# Patient Record
Sex: MALE | Race: WHITE | NOT HISPANIC OR LATINO | Employment: OTHER | URBAN - METROPOLITAN AREA
[De-identification: names, ages, dates, MRNs, and addresses within clinical notes are randomized per-mention and may not be internally consistent; named-entity substitution may affect disease eponyms.]

---

## 2020-12-01 ENCOUNTER — TELEPHONE (OUTPATIENT)
Dept: PEDIATRICS | Facility: CLINIC | Age: 75
End: 2020-12-01

## 2020-12-01 ENCOUNTER — OFFICE VISIT (OUTPATIENT)
Dept: PEDIATRICS | Facility: CLINIC | Age: 75
End: 2020-12-01
Payer: COMMERCIAL

## 2020-12-01 VITALS
HEIGHT: 73 IN | OXYGEN SATURATION: 98 % | WEIGHT: 210 LBS | BODY MASS INDEX: 27.83 KG/M2 | DIASTOLIC BLOOD PRESSURE: 75 MMHG | SYSTOLIC BLOOD PRESSURE: 165 MMHG | HEART RATE: 65 BPM | TEMPERATURE: 97.8 F

## 2020-12-01 DIAGNOSIS — G31.84 MILD COGNITIVE IMPAIRMENT: ICD-10-CM

## 2020-12-01 DIAGNOSIS — E78.00 HYPERCHOLESTEREMIA: ICD-10-CM

## 2020-12-01 DIAGNOSIS — E66.3 OVERWEIGHT (BMI 25.0-29.9): ICD-10-CM

## 2020-12-01 DIAGNOSIS — N40.1 BPH WITH OBSTRUCTION/LOWER URINARY TRACT SYMPTOMS: ICD-10-CM

## 2020-12-01 DIAGNOSIS — N13.8 BPH WITH OBSTRUCTION/LOWER URINARY TRACT SYMPTOMS: ICD-10-CM

## 2020-12-01 DIAGNOSIS — F41.1 GAD (GENERALIZED ANXIETY DISORDER): Primary | ICD-10-CM

## 2020-12-01 DIAGNOSIS — F41.1 GAD (GENERALIZED ANXIETY DISORDER): ICD-10-CM

## 2020-12-01 DIAGNOSIS — Z12.11 SCREENING FOR COLON CANCER: ICD-10-CM

## 2020-12-01 DIAGNOSIS — R73.01 IFG (IMPAIRED FASTING GLUCOSE): ICD-10-CM

## 2020-12-01 PROCEDURE — 99204 OFFICE O/P NEW MOD 45 MIN: CPT | Performed by: INTERNAL MEDICINE

## 2020-12-01 RX ORDER — SERTRALINE HYDROCHLORIDE 25 MG/1
50 TABLET, FILM COATED ORAL DAILY
Qty: 90 TABLET | Refills: 3 | Status: SHIPPED | OUTPATIENT
Start: 2020-12-01 | End: 2020-12-01

## 2020-12-01 RX ORDER — ATORVASTATIN CALCIUM 10 MG/1
10 TABLET, FILM COATED ORAL DAILY
Qty: 90 TABLET | Refills: 3 | Status: SHIPPED | OUTPATIENT
Start: 2020-12-01 | End: 2022-01-24

## 2020-12-01 RX ORDER — TAMSULOSIN HYDROCHLORIDE 0.4 MG/1
0.4 CAPSULE ORAL DAILY
Qty: 90 CAPSULE | Refills: 3 | Status: SHIPPED | OUTPATIENT
Start: 2020-12-01 | End: 2022-01-24

## 2020-12-01 RX ORDER — SERTRALINE HYDROCHLORIDE 25 MG/1
50 TABLET, FILM COATED ORAL DAILY
Qty: 180 TABLET | Refills: 3
Start: 2020-12-01 | End: 2020-12-01

## 2020-12-01 SDOH — HEALTH STABILITY: MENTAL HEALTH: HOW MANY STANDARD DRINKS CONTAINING ALCOHOL DO YOU HAVE ON A TYPICAL DAY?: 1 OR 2

## 2020-12-01 SDOH — HEALTH STABILITY: MENTAL HEALTH: HOW OFTEN DO YOU HAVE A DRINK CONTAINING ALCOHOL?: 2-3 TIMES A WEEK

## 2020-12-01 SDOH — HEALTH STABILITY: MENTAL HEALTH: HOW MANY DRINKS CONTAINING ALCOHOL DO YOU HAVE ON A TYPICAL DAY WHEN YOU ARE DRINKING?: 1 OR 2

## 2020-12-01 SDOH — HEALTH STABILITY: MENTAL HEALTH: HOW OFTEN DO YOU HAVE 6 OR MORE DRINKS ON ONE OCCASION?: LESS THAN MONTHLY

## 2020-12-01 SDOH — HEALTH STABILITY: MENTAL HEALTH: HOW OFTEN DO YOU HAVE SIX OR MORE DRINKS ON ONE OCCASION?: LESS THAN MONTHLY

## 2020-12-01 ASSESSMENT — MIFFLIN-ST. JEOR: SCORE: 1751.43

## 2020-12-01 NOTE — PROGRESS NOTES
Subjective     Kendrick Parr is a 75 year old male who presents to clinic today for the following health issues:    HPI         New Patient/Transfer of Care    Pt is here to Establish care    74-year-old gentleman has come in to establish care with me.  He has a history of hypercholesterolemia, impaired fasting glucose and generalized anxiety disorder.  Patient takes atorvastatin for hypercholesterolemia and 25 mg of sertraline for anxiety disorder.  He indicates that his anxiety has been worse lately and notices more particularly when he is with other people.  He was wondering if the dose of sertraline could be increased.  He also indicates that he has to get up once or twice at night to urinate and the stream at times is slow.  No hematuria or burning in the urine.  He denies chest pain or shortness of breath.  He is a non-smoker.  He exercises 5 days a week.  He still employed and quite active.      I reviewed his medical record in care everywhere.  It looks like that in 2012 and 2013 he was seen by a neurologist for possible memory issues.  He had MRI scan of the brain, lab and neuropsych testing.  At that time he was thought to have possible mild cognitive impairment.  Patient has not noticed any change in his memory since then.  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  He has no issues using the computer and connecting [place].    Review of Systems   Constitutional, HEENT, cardiovascular, pulmonary, GI, , musculoskeletal, neuro, skin, endocrine and psych systems are negative, except as otherwise noted.      Objective    There were no vitals taken for this visit.  There is no height or weight on file to calculate BMI.  Physical Exam   GENERAL: healthy, alert and no distress  EYES: Eyes grossly normal to inspection, PERRL and conjunctivae and sclerae normal  HENT: ear canals and TM's normal, nose and mouth without ulcers or  "lesions  NECK: no adenopathy, no asymmetry, masses, or scars and thyroid normal to palpation  RESP: lungs clear to auscultation - no rales, rhonchi or wheezes  CV: regular rate and rhythm, normal S1 S2, no S3 or S4, no murmur, click or rub, no peripheral edema and peripheral pulses strong  ABDOMEN: soft, nontender, no hepatosplenomegaly, no masses and bowel sounds normal   (male): normal male genitalia without lesions or urethral discharge, no hernia  RECTAL: normal sphincter tone, no rectal masses, prostate normal size, smooth, nontender without nodules or masses  MS: no gross musculoskeletal defects noted, no edema  SKIN: no suspicious lesions or rashes  NEURO: Normal strength and tone, mentation intact and speech normal  PSYCH: mentation appears normal, affect normal/bright  LYMPH: no cervical, supraclavicular, axillary, or inguinal adenopathy            Assessment & Plan     1.  Generalized anxiety disorder.  Will increase sertraline to 50 mg daily.  2.  Benign prostate hypertrophy with lower urinary obstructive symptoms.  His flow has slowed down so I will add tamsulosin 0.4 mg daily and see if it helps.  3.  Hypercholesterolemia on atorvastatin 10 mg a day.  He will come in fasting to have lipids checked.  4.  Impaired fasting glucose.  I will have him come fasting to have BMP and A1c checked.  5.  Overweight.  6.  Screening for colorectal cancer.  Patient referred for colonoscopy.  7.  Mild cognitive impairment diagnosed in 2012 with no clear evidence of progression.  It is possible that the patient just has mild memory loss of aging.  No overt evidence of dementia.    He already has had a flu vaccine.  I will get back to the results of the aforementioned lab with recommendation for follow-up.     BMI:   Estimated body mass index is 27.71 kg/m  as calculated from the following:    Height as of this encounter: 1.854 m (6' 1\").    Weight as of this encounter: 95.3 kg (210 lb).   Weight management plan: " Discussed healthy diet and exercise guidelines             No follow-ups on file.    Rakesh Santoro MD  [facility]

## 2020-12-01 NOTE — TELEPHONE ENCOUNTER
Spoke with pharmacy staff, requesting clarification of Sertraline prescribed today.  Dose was increased to sertraline 50 mg daily, but qty was written for 45 day supply.  Advised may increase to 90 day supply with refills, chart updated.      Emilia Hathaway RN

## 2020-12-01 NOTE — TELEPHONE ENCOUNTER
New prescription to [facility] sent of sertraline 50 mg once a day 90 tablets x 3 refill.

## 2020-12-03 DIAGNOSIS — R73.01 IFG (IMPAIRED FASTING GLUCOSE): ICD-10-CM

## 2020-12-03 DIAGNOSIS — N13.8 BPH WITH OBSTRUCTION/LOWER URINARY TRACT SYMPTOMS: ICD-10-CM

## 2020-12-03 DIAGNOSIS — N40.1 BPH WITH OBSTRUCTION/LOWER URINARY TRACT SYMPTOMS: ICD-10-CM

## 2020-12-03 DIAGNOSIS — E78.00 HYPERCHOLESTEREMIA: ICD-10-CM

## 2020-12-03 LAB
ANION GAP SERPL CALCULATED.3IONS-SCNC: 4 MMOL/L (ref 3–14)
BUN SERPL-MCNC: 23 MG/DL (ref 7–30)
CALCIUM SERPL-MCNC: 8.8 MG/DL (ref 8.5–10.1)
CHLORIDE SERPL-SCNC: 107 MMOL/L (ref 94–109)
CHOLEST SERPL-MCNC: 123 MG/DL
CO2 SERPL-SCNC: 28 MMOL/L (ref 20–32)
CREAT SERPL-MCNC: 0.75 MG/DL (ref 0.66–1.25)
GFR SERPL CREATININE-BSD FRML MDRD: >90 ML/MIN/{1.73_M2}
GLUCOSE SERPL-MCNC: 138 MG/DL (ref 70–99)
HBA1C MFR BLD: 5.7 % (ref 0–5.6)
HDLC SERPL-MCNC: 45 MG/DL
LDLC SERPL CALC-MCNC: 67 MG/DL
NONHDLC SERPL-MCNC: 78 MG/DL
POTASSIUM SERPL-SCNC: 3.8 MMOL/L (ref 3.4–5.3)
SODIUM SERPL-SCNC: 139 MMOL/L (ref 133–144)
TRIGL SERPL-MCNC: 55 MG/DL

## 2020-12-03 PROCEDURE — 36415 COLL VENOUS BLD VENIPUNCTURE: CPT | Performed by: INTERNAL MEDICINE

## 2020-12-03 PROCEDURE — 80048 BASIC METABOLIC PNL TOTAL CA: CPT | Performed by: INTERNAL MEDICINE

## 2020-12-03 PROCEDURE — 83036 HEMOGLOBIN GLYCOSYLATED A1C: CPT | Performed by: INTERNAL MEDICINE

## 2020-12-03 PROCEDURE — 80061 LIPID PANEL: CPT | Performed by: INTERNAL MEDICINE

## 2020-12-06 DIAGNOSIS — Z11.59 ENCOUNTER FOR SCREENING FOR OTHER VIRAL DISEASES: Primary | ICD-10-CM

## 2020-12-20 ENCOUNTER — HEALTH MAINTENANCE LETTER (OUTPATIENT)
Age: 75
End: 2020-12-20

## 2020-12-28 RX ORDER — SODIUM, POTASSIUM,MAG SULFATES 17.5-3.13G
1 SOLUTION, RECONSTITUTED, ORAL ORAL SEE ADMIN INSTRUCTIONS
Qty: 2 BOTTLE | Refills: 0 | Status: SHIPPED | OUTPATIENT
Start: 2020-12-28 | End: 2022-02-22

## 2020-12-28 RX ORDER — BISACODYL 5 MG/1
15 TABLET, DELAYED RELEASE ORAL SEE ADMIN INSTRUCTIONS
Qty: 3 TABLET | Refills: 0 | Status: SHIPPED | OUTPATIENT
Start: 2020-12-28 | End: 2022-02-22

## 2021-01-04 ENCOUNTER — HOSPITAL ENCOUNTER (OUTPATIENT)
Facility: AMBULATORY SURGERY CENTER | Age: 76
Discharge: HOME | End: 2021-01-04
Attending: INTERNAL MEDICINE | Admitting: INTERNAL MEDICINE
Payer: COMMERCIAL

## 2021-01-04 VITALS
OXYGEN SATURATION: 96 % | SYSTOLIC BLOOD PRESSURE: 148 MMHG | HEART RATE: 65 BPM | DIASTOLIC BLOOD PRESSURE: 85 MMHG | TEMPERATURE: 97.8 F | RESPIRATION RATE: 18 BRPM

## 2021-01-04 DIAGNOSIS — Z12.11 COLON CANCER SCREENING: Primary | ICD-10-CM

## 2021-01-04 LAB — COLONOSCOPY: NORMAL

## 2021-01-04 PROCEDURE — G8907 PT DOC NO EVENTS ON DISCHARG: HCPCS

## 2021-01-04 PROCEDURE — 88305 TISSUE EXAM BY PATHOLOGIST: CPT | Mod: GC | Performed by: PATHOLOGY

## 2021-01-04 PROCEDURE — G8918 PT W/O PREOP ORDER IV AB PRO: HCPCS

## 2021-01-04 PROCEDURE — 45385 COLONOSCOPY W/LESION REMOVAL: CPT

## 2021-01-04 RX ORDER — NALOXONE HYDROCHLORIDE 0.4 MG/ML
0.4 INJECTION, SOLUTION INTRAMUSCULAR; INTRAVENOUS; SUBCUTANEOUS
Status: DISCONTINUED | OUTPATIENT
Start: 2021-01-04 | End: 2021-01-05 | Stop reason: HOSPADM

## 2021-01-04 RX ORDER — FLUMAZENIL 0.1 MG/ML
0.2 INJECTION, SOLUTION INTRAVENOUS
Status: SHIPPED | OUTPATIENT
Start: 2021-01-04 | End: 2021-01-04

## 2021-01-04 RX ORDER — LIDOCAINE 40 MG/G
CREAM TOPICAL
Status: DISCONTINUED | OUTPATIENT
Start: 2021-01-04 | End: 2021-01-05 | Stop reason: HOSPADM

## 2021-01-04 RX ORDER — ONDANSETRON 2 MG/ML
4 INJECTION INTRAMUSCULAR; INTRAVENOUS
Status: DISCONTINUED | OUTPATIENT
Start: 2021-01-04 | End: 2021-01-05 | Stop reason: HOSPADM

## 2021-01-04 RX ORDER — ONDANSETRON 4 MG/1
4 TABLET, ORALLY DISINTEGRATING ORAL EVERY 6 HOURS PRN
Status: DISCONTINUED | OUTPATIENT
Start: 2021-01-04 | End: 2021-01-05 | Stop reason: HOSPADM

## 2021-01-04 RX ORDER — NALOXONE HYDROCHLORIDE 0.4 MG/ML
0.2 INJECTION, SOLUTION INTRAMUSCULAR; INTRAVENOUS; SUBCUTANEOUS
Status: DISCONTINUED | OUTPATIENT
Start: 2021-01-04 | End: 2021-01-05 | Stop reason: HOSPADM

## 2021-01-04 RX ORDER — PROCHLORPERAZINE MALEATE 5 MG
5 TABLET ORAL EVERY 6 HOURS PRN
Status: DISCONTINUED | OUTPATIENT
Start: 2021-01-04 | End: 2021-01-05 | Stop reason: HOSPADM

## 2021-01-04 RX ORDER — FENTANYL CITRATE 50 UG/ML
INJECTION, SOLUTION INTRAMUSCULAR; INTRAVENOUS PRN
Status: DISCONTINUED | OUTPATIENT
Start: 2021-01-04 | End: 2021-01-04 | Stop reason: HOSPADM

## 2021-01-04 RX ORDER — ONDANSETRON 2 MG/ML
4 INJECTION INTRAMUSCULAR; INTRAVENOUS EVERY 6 HOURS PRN
Status: DISCONTINUED | OUTPATIENT
Start: 2021-01-04 | End: 2021-01-05 | Stop reason: HOSPADM

## 2021-01-08 LAB — COPATH REPORT: NORMAL

## 2021-10-03 ENCOUNTER — HEALTH MAINTENANCE LETTER (OUTPATIENT)
Age: 76
End: 2021-10-03

## 2022-01-22 DIAGNOSIS — N40.1 BPH WITH OBSTRUCTION/LOWER URINARY TRACT SYMPTOMS: ICD-10-CM

## 2022-01-22 DIAGNOSIS — E78.00 HYPERCHOLESTEREMIA: ICD-10-CM

## 2022-01-22 DIAGNOSIS — N13.8 BPH WITH OBSTRUCTION/LOWER URINARY TRACT SYMPTOMS: ICD-10-CM

## 2022-01-22 DIAGNOSIS — F41.1 GAD (GENERALIZED ANXIETY DISORDER): ICD-10-CM

## 2022-01-23 ENCOUNTER — HEALTH MAINTENANCE LETTER (OUTPATIENT)
Age: 77
End: 2022-01-23

## 2022-01-24 RX ORDER — TAMSULOSIN HYDROCHLORIDE 0.4 MG/1
CAPSULE ORAL
Qty: 90 CAPSULE | Refills: 0 | Status: SHIPPED | OUTPATIENT
Start: 2022-01-24 | End: 2022-05-13

## 2022-01-24 RX ORDER — ATORVASTATIN CALCIUM 10 MG/1
TABLET, FILM COATED ORAL
Qty: 90 TABLET | Refills: 0 | Status: SHIPPED | OUTPATIENT
Start: 2022-01-24 | End: 2022-05-13

## 2022-01-24 NOTE — TELEPHONE ENCOUNTER
"Requested Prescriptions   Pending Prescriptions Disp Refills    sertraline (ZOLOFT) 50 MG tablet [Pharmacy Med Name: Sertraline HCl Oral Tablet 50 MG] 90 tablet 0     Sig: TAKE 1 TABLET BY MOUTH ONE TIME DAILY        SSRIs Protocol Failed - 1/22/2022 10:25 AM        Failed - Recent (12 mo) or future (30 days) visit within the authorizing provider's specialty     Patient has had an office visit with the authorizing provider or a provider within the authorizing providers department within the previous 12 mos or has a future within next 30 days. See \"Patient Info\" tab in inbasket, or \"Choose Columns\" in Meds & Orders section of the refill encounter.              Passed - Medication is active on med list        Passed - Patient is age 18 or older           atorvastatin (LIPITOR) 10 MG tablet [Pharmacy Med Name: Atorvastatin Calcium Oral Tablet 10 MG] 90 tablet 0     Sig: TAKE 1 TABLET BY MOUTH ONE TIME DAILY        Statins Protocol Failed - 1/22/2022 10:25 AM        Failed - LDL on file in past 12 months     Recent Labs   Lab Test 12/03/20  0738   LDL 67               Failed - Recent (12 mo) or future (30 days) visit within the authorizing provider's specialty     Patient has had an office visit with the authorizing provider or a provider within the authorizing providers department within the previous 12 mos or has a future within next 30 days. See \"Patient Info\" tab in inbasket, or \"Choose Columns\" in Meds & Orders section of the refill encounter.              Passed - No abnormal creatine kinase in past 12 months     No lab results found.             Passed - Medication is active on med list        Passed - Patient is age 18 or older           tamsulosin (FLOMAX) 0.4 MG capsule [Pharmacy Med Name: Tamsulosin HCl Oral Capsule 0.4 MG] 90 capsule 0     Sig: TAKE 1 CAPSULE BY MOUTH ONE TIME DAILY        Alpha Blockers Failed - 1/22/2022 10:25 AM        Failed - Blood pressure under 140/90 in past 12 months       BP " "Readings from Last 3 Encounters:   01/04/21 (!) 148/85   12/01/20 (!) 165/75                 Failed - Recent (12 mo) or future (30 days) visit within the authorizing provider's specialty     Patient has had an office visit with the authorizing provider or a provider within the authorizing providers department within the previous 12 mos or has a future within next 30 days. See \"Patient Info\" tab in inbasket, or \"Choose Columns\" in Meds & Orders section of the refill encounter.              Passed - Patient does not have Tadalafil, Vardenafil, or Sildenafil on their medication list        Passed - Medication is active on med list        Passed - Patient is 18 years of age or older            Appointments in Next Year      Feb 22, 2022  7:40 AM  (Arrive by 7:20 AM)  Provider Visit with Rakesh Santoro MD  [facility] ([facility] ) [phone]            "

## 2022-01-25 ENCOUNTER — TELEPHONE (OUTPATIENT)
Dept: FAMILY MEDICINE | Facility: CLINIC | Age: 77
End: 2022-01-25
Payer: COMMERCIAL

## 2022-01-25 DIAGNOSIS — E78.00 HYPERCHOLESTEREMIA: ICD-10-CM

## 2022-01-25 DIAGNOSIS — R73.01 IFG (IMPAIRED FASTING GLUCOSE): Primary | ICD-10-CM

## 2022-01-25 NOTE — TELEPHONE ENCOUNTER
Patient called: let him know refill has been sent in    He has a physical scheduled in February. And a lab only appointment the week before.    Sent Dr Santoro a message requesting lab orders be placed.

## 2022-02-14 ENCOUNTER — LAB (OUTPATIENT)
Dept: LAB | Facility: CLINIC | Age: 77
End: 2022-02-14
Payer: COMMERCIAL

## 2022-02-14 DIAGNOSIS — R73.01 IFG (IMPAIRED FASTING GLUCOSE): ICD-10-CM

## 2022-02-14 DIAGNOSIS — E78.00 HYPERCHOLESTEREMIA: ICD-10-CM

## 2022-02-14 LAB
ANION GAP SERPL CALCULATED.3IONS-SCNC: 3 MMOL/L (ref 3–14)
BUN SERPL-MCNC: 21 MG/DL (ref 7–30)
CALCIUM SERPL-MCNC: 9.4 MG/DL (ref 8.5–10.1)
CHLORIDE BLD-SCNC: 107 MMOL/L (ref 94–109)
CHOLEST SERPL-MCNC: 141 MG/DL
CO2 SERPL-SCNC: 30 MMOL/L (ref 20–32)
CREAT SERPL-MCNC: 0.78 MG/DL (ref 0.66–1.25)
FASTING STATUS PATIENT QL REPORTED: YES
GFR SERPL CREATININE-BSD FRML MDRD: >90 ML/MIN/1.73M2
GLUCOSE BLD-MCNC: 141 MG/DL (ref 70–99)
HBA1C MFR BLD: 6.1 % (ref 0–5.6)
HDLC SERPL-MCNC: 41 MG/DL
LDLC SERPL CALC-MCNC: 91 MG/DL
NONHDLC SERPL-MCNC: 100 MG/DL
POTASSIUM BLD-SCNC: 4.4 MMOL/L (ref 3.4–5.3)
SODIUM SERPL-SCNC: 140 MMOL/L (ref 133–144)
TRIGL SERPL-MCNC: 47 MG/DL

## 2022-02-14 PROCEDURE — 80048 BASIC METABOLIC PNL TOTAL CA: CPT

## 2022-02-14 PROCEDURE — 83036 HEMOGLOBIN GLYCOSYLATED A1C: CPT

## 2022-02-14 PROCEDURE — 80061 LIPID PANEL: CPT

## 2022-02-14 PROCEDURE — 36415 COLL VENOUS BLD VENIPUNCTURE: CPT

## 2022-02-15 NOTE — RESULT ENCOUNTER NOTE
Dr. Yvan Escalante is out of the office this week .I reviewed your lab results in his absence   Your lab test showed excellent fasting cholesterol , normal electrolytes and elevated fasting blood sugar and A1c consistent with prediabetes .  Dr. Santoro will discuss about these at your visit next week.   Let me know if you have any questions. Take care.  Jaswinder Sweeney MD

## 2022-02-22 ENCOUNTER — OFFICE VISIT (OUTPATIENT)
Dept: FAMILY MEDICINE | Facility: CLINIC | Age: 77
End: 2022-02-22
Payer: COMMERCIAL

## 2022-02-22 VITALS
OXYGEN SATURATION: 98 % | SYSTOLIC BLOOD PRESSURE: 138 MMHG | HEART RATE: 72 BPM | TEMPERATURE: 98.2 F | HEIGHT: 73 IN | WEIGHT: 213.7 LBS | RESPIRATION RATE: 16 BRPM | DIASTOLIC BLOOD PRESSURE: 70 MMHG | BODY MASS INDEX: 28.32 KG/M2

## 2022-02-22 DIAGNOSIS — N40.1 BPH WITH OBSTRUCTION/LOWER URINARY TRACT SYMPTOMS: ICD-10-CM

## 2022-02-22 DIAGNOSIS — Z23 NEED FOR PROPHYLACTIC VACCINATION AND INOCULATION AGAINST INFLUENZA: ICD-10-CM

## 2022-02-22 DIAGNOSIS — H90.3 BILATERAL SENSORINEURAL HEARING LOSS: ICD-10-CM

## 2022-02-22 DIAGNOSIS — F41.1 GAD (GENERALIZED ANXIETY DISORDER): ICD-10-CM

## 2022-02-22 DIAGNOSIS — N13.8 BPH WITH OBSTRUCTION/LOWER URINARY TRACT SYMPTOMS: ICD-10-CM

## 2022-02-22 DIAGNOSIS — E78.00 HYPERCHOLESTEREMIA: ICD-10-CM

## 2022-02-22 DIAGNOSIS — R73.01 IFG (IMPAIRED FASTING GLUCOSE): ICD-10-CM

## 2022-02-22 DIAGNOSIS — Z00.00 MEDICARE ANNUAL WELLNESS VISIT, INITIAL: Primary | ICD-10-CM

## 2022-02-22 DIAGNOSIS — R20.0 NUMBNESS OF TOES: ICD-10-CM

## 2022-02-22 DIAGNOSIS — E66.3 OVERWEIGHT (BMI 25.0-29.9): ICD-10-CM

## 2022-02-22 PROCEDURE — 90662 IIV NO PRSV INCREASED AG IM: CPT | Performed by: INTERNAL MEDICINE

## 2022-02-22 PROCEDURE — 99214 OFFICE O/P EST MOD 30 MIN: CPT | Mod: 25 | Performed by: INTERNAL MEDICINE

## 2022-02-22 PROCEDURE — G0008 ADMIN INFLUENZA VIRUS VAC: HCPCS | Performed by: INTERNAL MEDICINE

## 2022-02-22 PROCEDURE — 99397 PER PM REEVAL EST PAT 65+ YR: CPT | Mod: 25 | Performed by: INTERNAL MEDICINE

## 2022-02-22 ASSESSMENT — PAIN SCALES - GENERAL: PAINLEVEL: NO PAIN (0)

## 2022-02-22 NOTE — PROGRESS NOTES
SUBJECTIVE:   Kendrick Parr is a 76 year old male who presents for Preventive Visit.    76-year-old comes in for annual Medicare physical examination as well as follow-up on hyperlipidemia, impaired fasting glucose, BPH and anxiety disorder.  Overall he is doing well..  He is still working and enjoys what he does.  He exercise by walking and he does some weight resistant exercise 3 times a week at the gym.  He does not use tobacco or abuse alcohol.  He has gained few pounds since last clinic visit.  Does have nocturia at least once a night.  Complains of some hearing issues.    Patient has been advised of split billing requirements and indicates understanding: Yes  Are you in the first 12 months of your Medicare coverage?  No    History of Present Illness     Reason for visit:  Annual exam an pain in toes  Symptom onset:  More than a month  Symptoms include:  Numbness  Symptom intensity:  Mild  Symptom progression:  Staying the same  Had these symptoms before:  Yes  Has tried/received treatment for these symptoms:  No  What makes it better:  Exercise    He eats 2-3 servings of fruits and vegetables daily.He consumes 0 sweetened beverage(s) daily.He exercises with enough effort to increase his heart rate 20 to 29 minutes per day.  He exercises with enough effort to increase his heart rate 3 or less days per week.   He is taking medications regularly.    Do you feel safe in your environment? Yes    Have you ever done Advance Care Planning? (For example, a Health Directive, POLST, or a discussion with a medical provider or your loved ones about your wishes): No, advance care planning information given to patient to review.  Patient plans to discuss their wishes with loved ones or provider.          Hearing Assessment: decrease hearing. Will refer to audiology   Fall risk  Fallen 2 or more times in the past year?: No  Any fall with injury in the past year?: No  click delete button to remove this line now  Cognitive  Screening   1) Repeat 3 items (Leader, Season, Table)  Normal   2) Clock draw: NORMAL  3) 3 item recall: Recalls 3 objects  Results: NORMAL clock, 1-2 items recalled: COGNITIVE IMPAIRMENT LESS LIKELY    Mini-CogTM Copyright S [employer]. Licensed by the author for use in [facility]; reprinted with permission (felipe@[facility]). All rights reserved.      Do you have sleep apnea, excessive snoring or daytime drowsiness?: no    Reviewed and updated as needed this visit by clinical staff                  Reviewed and updated as needed this visit by Provider                 Social History     Tobacco Use     Smoking status: Never Smoker     Smokeless tobacco: Never Used   Substance Use Topics     Alcohol use: Yes     Comment: occas         No flowsheet data found.        Pain in feet and legs cramps on both legs.    Current providers sharing in care for this patient include:   Patient Care Team:  No Ref-Primary, Physician as PCP - Rakesh Taveras MD as Assigned PCP    The following health maintenance items are reviewed in Epic and correct as of today:  Health Maintenance Due   Topic Date Due     ANNUAL REVIEW OF  ORDERS  Never done     ADVANCE CARE PLANNING  Never done     HEPATITIS C SCREENING  Never done     MEDICARE ANNUAL WELLNESS VISIT  Never done     FALL RISK ASSESSMENT  Never done     AORTIC ANEURYSM SCREENING (SYSTEM ASSIGNED)  Never done     ZOSTER IMMUNIZATION (2 of 3) 12/30/2015     INFLUENZA VACCINE (1) 09/01/2021     COVID-19 Vaccine (3 - Booster for Pfizer series) 10/06/2021     PHQ-2  Never done     BP Readings from Last 3 Encounters:   02/22/22 (!) 162/82   01/04/21 (!) 148/85   12/01/20 (!) 165/75    Wt Readings from Last 3 Encounters:   02/22/22 96.9 kg (213 lb 11.2 oz)   12/01/20 95.3 kg (210 lb)                  Patient Active Problem List   Diagnosis     ROBE (generalized anxiety disorder)     Hypercholesteremia     IFG (impaired fasting glucose)     Overweight (BMI 25.0-29.9)      Mild cognitive impairment     BPH with obstruction/lower urinary tract symptoms     Past Surgical History:   Procedure Laterality Date     APPENDECTOMY  1960     COLONOSCOPY WITH CO2 INSUFFLATION N/A 1/4/2021    Procedure: COLONOSCOPY, WITH CO2 INSUFFLATION;  Surgeon: Livier Nguyen DO;  Location: [facility] OR     OPEN REDUCTION INTERNAL FIXATION FOREARM  2017       Social History     Tobacco Use     Smoking status: Never Smoker     Smokeless tobacco: Never Used   Substance Use Topics     Alcohol use: Yes     Comment: occas     Family History   Problem Relation Age of Onset     Alzheimer Disease Mother 86     Leukemia Father 86     Lymphoma Sister 51         Current Outpatient Medications   Medication Sig Dispense Refill     atorvastatin (LIPITOR) 10 MG tablet TAKE 1 TABLET BY MOUTH ONE TIME DAILY 90 tablet 0     bisacodyl (DULCOLAX) 5 MG EC tablet Take 3 tablets (15 mg) by mouth See Admin Instructions --Take at 5 PM day prior to procedure 3 tablet 0     cyanocobalamin (VITAMIN  B-12) 1000 MCG tablet Take 2,000 mcg by mouth daily       Na Sulfate-K Sulfate-Mg Sulf (SUPREP BOWEL PREP KIT) solution Take 177 mLs (1 Bottle) by mouth See Admin Instructions -Split dose 2 day Regimen: The evening before your procedure: dilute one bottle with water to a total volume of 16oz (up to the fill line).  At 6pm,  drink the entire amount.  Drink 32 oz of water over the next hour. The morning of your procedure repeat both steps above using the second bottle.  Start 5 hours before your procedure and complete the prep at least 3 hours before you arrive. 2 Bottle 0     polyethylene glycol (GOLYTELY) 236 g suspension Take 4,000 mLs by mouth See Admin Instructions --Drink half gallon (64oz) at 6pm on evening prior to procedure and second half on the morning of procedure (4 hours prior to procedure time) 1 each 0     sertraline (ZOLOFT) 50 MG tablet TAKE 1 TABLET BY MOUTH ONE TIME DAILY 90 tablet 0     Simethicone 125 MG TABS Take 125 mg by  "mouth See Admin Instructions --Take tablet after finishing second half of Golytely with half a glass of water. 1 tablet 0     Simethicone 125 MG TABS Take 125 mg by mouth See Admin Instructions --Take tablet after finishing second half of Suprep with half a glass of water. 1 tablet 0     tamsulosin (FLOMAX) 0.4 MG capsule TAKE 1 CAPSULE BY MOUTH ONE TIME DAILY 90 capsule 0     fluocinolone (SYNALAR) 0.01 % external solution Apply nightly to the scalp for up to 1 week for flares (Patient not taking: Reported on 2/22/2022) 60 mL 0     No Known Allergies  Recent Labs   Lab Test 02/14/22  0756 12/03/20  0738   A1C 6.1* 5.7*   LDL 91 67   HDL 41 45   TRIG 47 55   CR 0.78 0.75   GFRESTIMATED >90 >90   GFRESTBLACK  --  >90   POTASSIUM 4.4 3.8          Review of Systems  Constitutional, HEENT, cardiovascular, pulmonary, GI, , musculoskeletal, neuro, skin, endocrine and psych systems are negative, except as otherwise noted.    OBJECTIVE:   There were no vitals taken for this visit. Estimated body mass index is 27.71 kg/m  as calculated from the following:    Height as of 12/1/20: 1.854 m (6' 1\").    Weight as of 12/1/20: 95.3 kg (210 lb).  Physical Exam  GENERAL: healthy, alert and no distress  EYES: Eyes grossly normal to inspection, PERRL and conjunctivae and sclerae normal  HENT: ear canals and TM's normal, nose and mouth without ulcers or lesions  NECK: no adenopathy, no asymmetry, masses, or scars and thyroid normal to palpation  RESP: lungs clear to auscultation - no rales, rhonchi or wheezes  CV: regular rate and rhythm, normal S1 S2, no S3 or S4, no murmur, click or rub, no peripheral edema and peripheral pulses strong  ABDOMEN: soft, nontender, no hepatosplenomegaly, no masses and bowel sounds normal   (male): normal male genitalia without lesions or urethral discharge, no hernia  RECTAL: normal sphincter tone, no rectal masses, prostate normal size, smooth, nontender without nodules or masses  MS: no gross " musculoskeletal defects noted, no edema  SKIN: no suspicious lesions or rashes  NEURO: Normal strength and tone, mentation intact and speech normal  PSYCH: mentation appears normal, affect normal/bright  LYMPH: no cervical, supraclavicular, axillary, or inguinal adenopathy    Diagnostic Test Results:  Labs reviewed in Epic  Lab 02/14/2022 lytes normal. Creatinine 0.78   Cholesterol 141, HDL 41, LDL 91 Triglycerides 47  FBS: 141 A1c 6.1      ASSESSMENT / PLAN:     1.  Medicare annual wellness exam performed and is good.  2.  Hypercholesterolemia adequately treated with atorvastatin 10 mg a day.  Recent lipid profile is good.  Continue same medication.  Recheck in 1 year.  3.  Impaired fasting glucose/borderline type 2 diabetes mellitus.  Fasting blood sugar consistent with type 2 diabetes mellitus at 141 however A1c was 6.1.  Discussed starting Metformin at 500 mg a day.  Patient decided to work on diet exercise and weight loss.  Discussed low-carb diet.  He will return in September with fasting blood sugar and A1c.  4.  Overweight with a BMI close to 29.  Weight reduction discussed.  5.  Generalized anxiety disorder well-controlled with sertraline 50 mg a day and he will continue same.  7.  BPH with lower urinary obstructive symptoms.  Slightly improved with tamsulosin and he will continue same.  Recheck in 1 year.  8.  Bilateral sensorineural hearing loss suspected.  Will refer to audiology for comprehensive evaluation.  9.  Numbness of toes.  Pain and touch perception is intact on exam but this could be an early sign of diabetes related neuropathy.  Discussed at length with patient.  10.  Influenza vaccine provided today.  11.  Patient declined booster COVID-19 vaccine and he also declined Shingrix vaccine.        COUNSELING:  Reviewed preventive health counseling, as reflected in patient instructions    Estimated body mass index is 27.71 kg/m  as calculated from the following:    Height as of 12/1/20: 1.854 m (6'  "1\").    Weight as of 12/1/20: 95.3 kg (210 lb).    Weight management plan: Discussed healthy diet and exercise guidelines    He reports that he has never smoked. He has never used smokeless tobacco.      Appropriate preventive services were discussed with this patient, including applicable screening as appropriate for cardiovascular disease, diabetes, osteopenia/osteoporosis, and glaucoma.  As appropriate for age/gender, discussed screening for colorectal cancer, prostate cancer, breast cancer, and cervical cancer. Checklist reviewing preventive services available has been given to the patient.    Reviewed patients plan of care and provided an AVS. The Basic Care Plan (routine screening as documented in Health Maintenance) for Kendrick meets the Care Plan requirement. This Care Plan has been established and reviewed with the Patient.    Counseling Resources:  ATP IV Guidelines  Pooled Cohorts Equation Calculator  Breast Cancer Risk Calculator  Breast Cancer: Medication to Reduce Risk  FRAX Risk Assessment  ICSI Preventive Guidelines  Dietary Guidelines for Americans, 2010  USDA's MyPlate  ASA Prophylaxis  Lung CA Screening    Rakesh Santoro MD  [facility]    Identified Health Risks:  "

## 2022-09-04 ENCOUNTER — HEALTH MAINTENANCE LETTER (OUTPATIENT)
Age: 77
End: 2022-09-04

## 2022-12-02 ENCOUNTER — NURSE TRIAGE (OUTPATIENT)
Dept: NURSING | Facility: CLINIC | Age: 77
End: 2022-12-02

## 2022-12-02 ENCOUNTER — ANCILLARY PROCEDURE (OUTPATIENT)
Dept: GENERAL RADIOLOGY | Facility: CLINIC | Age: 77
End: 2022-12-02
Attending: PHYSICIAN ASSISTANT
Payer: COMMERCIAL

## 2022-12-02 ENCOUNTER — OFFICE VISIT (OUTPATIENT)
Dept: URGENT CARE | Facility: URGENT CARE | Age: 77
End: 2022-12-02
Payer: COMMERCIAL

## 2022-12-02 VITALS
TEMPERATURE: 98.7 F | BODY MASS INDEX: 26.83 KG/M2 | DIASTOLIC BLOOD PRESSURE: 70 MMHG | HEIGHT: 73 IN | OXYGEN SATURATION: 98 % | WEIGHT: 202.4 LBS | SYSTOLIC BLOOD PRESSURE: 168 MMHG | HEART RATE: 75 BPM

## 2022-12-02 DIAGNOSIS — M25.552 HIP PAIN, LEFT: Primary | ICD-10-CM

## 2022-12-02 DIAGNOSIS — M25.552 HIP PAIN, LEFT: ICD-10-CM

## 2022-12-02 DIAGNOSIS — M16.12 OSTEOARTHRITIS OF LEFT HIP, UNSPECIFIED OSTEOARTHRITIS TYPE: ICD-10-CM

## 2022-12-02 DIAGNOSIS — K59.00 CONSTIPATION, UNSPECIFIED CONSTIPATION TYPE: ICD-10-CM

## 2022-12-02 PROCEDURE — 99214 OFFICE O/P EST MOD 30 MIN: CPT | Performed by: PHYSICIAN ASSISTANT

## 2022-12-02 PROCEDURE — 73502 X-RAY EXAM HIP UNI 2-3 VIEWS: CPT | Mod: TC | Performed by: STUDENT IN AN ORGANIZED HEALTH CARE EDUCATION/TRAINING PROGRAM

## 2022-12-02 RX ORDER — PREDNISONE 20 MG/1
20 TABLET ORAL DAILY
Qty: 5 TABLET | Refills: 0 | Status: SHIPPED | OUTPATIENT
Start: 2022-12-02 | End: 2022-12-07

## 2022-12-02 RX ORDER — POLYETHYLENE GLYCOL 3350 17 G/17G
1 POWDER, FOR SOLUTION ORAL DAILY
Qty: 116 G | Refills: 0 | Status: SHIPPED | OUTPATIENT
Start: 2022-12-02 | End: 2022-12-12

## 2022-12-02 NOTE — TELEPHONE ENCOUNTER
Last week started having pain in Llt side. Resembled sciatica. Tylenol was ineffective. Went to [facility] and saw a Dr. EKTA Rodriguez. She gave a prescription for Tylenol #3. He's just about out (will run out tomorrow) Has also seen a chiropractor at [place], who recommended a steroid dose pack. Patient is requesting prescription. Writer checked website for [facility] website, and wait time is short. Patient will not be able to fill narcotic prescription until clinic hours start on Monday. [place] [facility] for both a refill on the Tylenol #3, as well as ask for a steroid dose pack, as his chiropractor recommended.    Carmen Matute RN on 12/2/2022 at 5:46 PM      Reason for Disposition    Caller requesting a CONTROLLED substance prescription refill (e.g., narcotics, ADHD medicines)    Additional Information    Negative: New-onset or worsening symptoms, see that guideline (e.g., diarrhea, runny nose, sore throat)    Negative: Medicine question not related to refill or renewal    Negative: Caller (e.g., patient or pharmacist) requesting information about a new medicine    Negative: Caller requesting information unrelated to medicine    Negative: [1] Prescription refill request for ESSENTIAL medicine (i.e., likelihood of harm to patient if not taken) AND [2] triager unable to refill per department policy    Negative: [1] Prescription not at pharmacy AND [2] was prescribed by PCP recently  (Exception: triager has access to EMR and prescription is recorded there. Go to Home Care and confirm for pharmacy.)    Negative: [1] Pharmacy calling with prescription questions AND [2] triager unable to answer question    Negative: Prescription request for new medicine (not a refill)    Protocols used: MEDICATION REFILL AND RENEWAL CALL-A-

## 2022-12-03 NOTE — PROGRESS NOTES
X-rays-I see lots of hard stool bladder area. Mild osteoarthritis hips and SI joints  Results for orders placed or performed in visit on 12/02/22   XR Pelvis w Hip Left G/E 2 Views     Status: None    Narrative    EXAM: XR PELVIS AND HIP LEFT 2 VIEWS  LOCATION: [facility]  DATE/TIME: 12/2/2022 7:41 PM    INDICATION:  Hip pain, left  COMPARISON: None.      Impression    IMPRESSION: No acute fracture or dislocation within the limitations of osteopenia, however recommend MRI for further evaluation if there is clinical concern for radiographically occult fracture. Mild degenerative changes of the hips and sacroiliac   joints.       ASSESSMENT:      ICD-10-CM    1. Hip pain, left  M25.552 XR Pelvis w Hip Left G/E 2 Views     Orthopedic  Referral     polyethylene glycol (MIRALAX) 17 GM/Dose powder     predniSONE (DELTASONE) 20 MG tablet      2. Osteoarthritis of left hip, unspecified osteoarthritis type  M16.12 Orthopedic  Referral     polyethylene glycol (MIRALAX) 17 GM/Dose powder     predniSONE (DELTASONE) 20 MG tablet      3. Constipation, unspecified constipation type  K59.00 polyethylene glycol (MIRALAX) 17 GM/Dose powder                PLAN: Kendrick Parr is an 77 year old male who presents with left hip pain a week ago.   -X-ray of left hip/pelvis  Prednisone.  Miralax. See Ortho- they can determine whether or not MRI is indicated.  Differential includes but is not limited to osteoarthritis, left sacroiliac joint sprain/train, left hip bursitis, multiple myeloma, avascular necrosis of the hip, lumbar spine neural impingement.    Ashlie Rea PA-C        SUBJECTIVE:  Kendrick Parr is an 77 year old male who presents with left hip pain a week ago. He was at the Urgent care center last Monday for the same issue and was prescribed tylenol #3 which was ineffective for pain management. States pain gets worse at night. Visited Chiropractic. Denies bowel or bladder  "problem. No fever or chills noted. Mild numbness to left lower leg.  No night sweats or weight loss.  No bowel or bladder trouble.  Able to tell when he has to go to the bathroom and is able to get there at night.          Past Medical History:   Diagnosis Date     BPH with obstruction/lower urinary tract symptoms 12/1/2020     ROBE (generalized anxiety disorder)      Hypercholesteremia      IFG (impaired fasting glucose) 12/1/2020     Mild cognitive impairment 5/1/2012     Overweight (BMI 25.0-29.9) 12/1/2020     History   Smoking Status     Never   Smokeless Tobacco     Never       ROS:  GEN no fevers  SKIN no erythema  Musculoskeletal:  See HPI.      OBJECTIVE:  BP (!) 168/70 (BP Location: Left arm, Patient Position: Sitting, Cuff Size: Adult Regular)   Pulse 75   Temp 98.7  F (37.1  C) (Tympanic)   Ht 1.854 m (6' 1\")   Wt 91.8 kg (202 lb 6.4 oz)   SpO2 98%   BMI 26.70 kg/m      Patient is alert and NAD.  EYES: conjunctiva clear  Leg/hip Exam (left):  Inspection:no swelling seen  Palpation:tender over left SI joint area.  Also tender over trochanteric area.   Left hip very stiff with very limited external and internal rotation.  Some discomfort with it.  Negative Mitali's test.  Supine straight leg raise negative bilaterally.   Lumbar spine nontender to palpation.  Full flexion and extension with minimal discomfort.   Cap refill intact.    Good doralis pedis. Strong Pedal pulses  Neurovascularly Intact Distally.         Ashlie Rea PA-C    "

## 2022-12-05 NOTE — TELEPHONE ENCOUNTER
DIAGNOSIS: Hip pain-Osteoarthritis of left hip   APPOINTMENT DATE: 12/13/2022   NOTES STATUS DETAILS   OFFICE NOTE from referring provider Internal 12/02/2022 Dr Rea [facility]    OFFICE NOTE from other specialist N/A    DISCHARGE SUMMARY from hospital N/A    DISCHARGE REPORT from the ER Care Everywhere 11/29/2022 [facility]   OPERATIVE REPORT N/A    MEDICATION LIST N/A    EMG (for Spine) N/A    IMPLANT RECORD/STICKER N/A    LABS     CBC/DIFF N/A    CULTURES N/A    INJECTIONS DONE IN RADIOLOGY N/A    MRI N/A    CT SCAN N/A    XRAYS (IMAGES & REPORTS) Internal 12/02/2022 pelvis hip   TUMOR     PATHOLOGY  Slides & report N/A

## 2022-12-06 ENCOUNTER — OFFICE VISIT (OUTPATIENT)
Dept: FAMILY MEDICINE | Facility: CLINIC | Age: 77
End: 2022-12-06
Payer: COMMERCIAL

## 2022-12-06 VITALS
HEART RATE: 75 BPM | RESPIRATION RATE: 20 BRPM | DIASTOLIC BLOOD PRESSURE: 81 MMHG | WEIGHT: 208 LBS | TEMPERATURE: 97.3 F | BODY MASS INDEX: 27.57 KG/M2 | SYSTOLIC BLOOD PRESSURE: 130 MMHG | OXYGEN SATURATION: 98 % | HEIGHT: 73 IN

## 2022-12-06 DIAGNOSIS — M54.50 ACUTE LEFT-SIDED LOW BACK PAIN WITHOUT SCIATICA: Primary | ICD-10-CM

## 2022-12-06 DIAGNOSIS — F41.1 GAD (GENERALIZED ANXIETY DISORDER): ICD-10-CM

## 2022-12-06 PROCEDURE — 99213 OFFICE O/P EST LOW 20 MIN: CPT | Performed by: PREVENTIVE MEDICINE

## 2022-12-06 RX ORDER — DICLOFENAC SODIUM 75 MG/1
75 TABLET, DELAYED RELEASE ORAL 2 TIMES DAILY PRN
Qty: 10 TABLET | Refills: 0 | Status: SHIPPED | OUTPATIENT
Start: 2022-12-06 | End: 2023-04-11

## 2022-12-06 ASSESSMENT — PAIN SCALES - GENERAL: PAINLEVEL: MILD PAIN (2)

## 2022-12-06 NOTE — PROGRESS NOTES
"  Assessment & Plan     Acute left-sided low back pain without sciatica  -symptoms are better with Prednisone  -short term script for Diclofenac given in case has back pain during the concert   -heat application  -can use over the counter Lidocaine patches or Icy Hot patch  -has appointment with Orthopedics   - diclofenac (VOLTAREN) 75 MG EC tablet  Dispense: 10 tablet; Refill: 0    ROBE (generalized anxiety disorder)  -taking selective serotonin reuptake inhibitor Sertraline, drug interaction with Diclofenac reviewed, will monitor for GI symptoms       Prescription drug management  20 minutes spent on the date of the encounter doing chart review, history and exam, documentation and further activities per the note     MED REC REQUIRED  Post Medication Reconciliation Status:  Patient was not discharged from an inpatient facility or TCU    BMI:   Estimated body mass index is 27.44 kg/m  as calculated from the following:    Height as of this encounter: 1.854 m (6' 1\").    Weight as of this encounter: 94.3 kg (208 lb).       Return in about 1 week (around 12/13/2022) if symptoms worsen or fail to improve.    Samara Juárez MD MPH    [facility]    Dwight Germain is a 77 year old presenting for the following health issues:  Hospital F/U and Recheck Medication      HPI       Emergency room Follow-up Visit:    Hospital/Nursing Home/IP Rehab Facility: [facility]  Date of Admission: 11/29/2022  Date of Discharge: 11/29/2022  Reason(s) for Admission: Sciatica Nerves pain    Was your hospitalization related to COVID-19? No   Problems taking medications regularly:  None  Medication changes since discharge: Pt states \" was given tylenol which wasn't working and later give him predisone. More concern about the pain if its going to go away when he stop taking the medication.  Problems adhering to non-medication therapy:  None    ED information is as follows:    \"MEDICAL DECISION " "MAKING & DIAGNOSIS     76 y/o male presents to the ED with hip pain. Patient's vital signs showed hypertension. Differential diagnosis considered includes but is not limited to sciatica, arthritis, musculoskeletal strain, gout, fracture, infectious etiology. Based on history and physical exam I do suspect patient's symptoms may be related to sciatica. He has a history of this and states the symptoms feel very similar. He has no red flag symptoms and has intact strength. He is also having some left knee pain that seems separate. This seems likely musculoskeletal. There is no trauma and no evidence of fracture. No gross instability. No swelling or redness to suggest infectious etiology or DVT. Discussed symptomatic treatment. Discussed indications for return to the emergency department. Patient was in agreement with this plan.    ICD-10-CM   1. Acute left-sided low back pain with left-sided sciatica M54.42   2. Acute pain of left knee M25.562   3. Hypertension I10  \"      Was given Tylenol with Codeine but helped for short time  Prednisone also taken  Has 2 pills left of the Prednisone  Has a concert this weekend and has to stand for a while, his main concern/question is if the pain will recur once he is done with the Prednisone   Has appointment with ortho+   Seeing chiropractor +   Pain is 1/10, slight right now  Some in the knee as well  Sleep was disrupted but better with prednisone  Works and sits in front of the computer  Pain is not radiating anymore  No focal tingling  No rash  2 weeks ago did slip on snow while walking his dogs  No pain at that time  No back surgeries or injections   Pain is about 80% better         The patient does not have any focal weakness or numbness, no urine or stool incontinence, no hematuria, no saddle anesthesia and no gait abnormalities.       Review of Systems   Constitutional, HEENT, cardiovascular, pulmonary, gi and gu systems are negative, except as otherwise noted.    " "  Objective    /81 (BP Location: Left arm, Patient Position: Sitting, Cuff Size: Adult Regular)   Pulse 75   Temp 97.3  F (36.3  C) (Tympanic)   Resp 20   Ht 1.854 m (6' 1\")   Wt 94.3 kg (208 lb)   SpO2 98%   BMI 27.44 kg/m    Body mass index is 27.44 kg/m .  Physical Exam   GENERAL APPEARANCE: healthy, alert and no distress  EYES: Eyes grossly normal to inspection and conjunctivae and sclerae normal  RESP: lungs clear to auscultation - no rales, rhonchi or wheezes  CV: regular rates and rhythm, normal S1 S2  ABDOMEN: soft, non-tender and no rebound or guarding   MS: extremities normal- no gross deformities noted   SKIN: no suspicious lesions or rashes  NEURO: Normal strength and tone, mentation intact and speech normal, gait normal, able to heel and toe walk  PSYCH: mentation appears normal      Lumbar spine: No swelling, bruising, erythema atrophy or deformity.  No tenderness noted on palpation of spinous processes.  Range of motion of the lumbar spine is full in all directions without focal deficit.  Strength is full.  SLR negative bilaterally.  Distal motor and sensory exam is intact.  Reflexes are 2+ and symmetrical.  Distal pulses intact. Mild sacroiliac tenderness on the left side.  Great toe extension normal.                 "

## 2022-12-09 ENCOUNTER — TELEPHONE (OUTPATIENT)
Dept: ORTHOPEDICS | Facility: CLINIC | Age: 77
End: 2022-12-09

## 2022-12-09 NOTE — TELEPHONE ENCOUNTER
12/9 Called and left voicemail. Explained we might need to reschedule upcoming appointment with Dr. Goldberg. Patient can keep appointment with Dr. Goldberg if he is waiting to discuss a total hip replacement.     If not please reschedule patient with a sports medicine doctor.    Libby hansen Procedure   Orthopedics, Podiatry, Sports Medicine, Ent ,Eye , Audiology, Adult Endocrine & Diabetes, Nutrition & Medication Therapy Management Specialties   [facility]         ----- Message from Reina Kelley ATC sent at 12/9/2022 10:38 AM CST -----  Libby,    Could you call this patient and see if he is wanting to discuss total hip with Dr. Goldberg? If he doesn't want to discuss total hip, then he should be rescheduled to sports medicine.      Reina Kelley MA, ATC

## 2022-12-12 NOTE — TELEPHONE ENCOUNTER
Called patient to discuss upcoming appointment with Dr. Goldberg. Patient explains he has sciatica on his left side and has been the the Chiropractor (orthology) and urgent care for pain management. On the XRs they determined MRI may be indicated for occult fracture but patient doesn't believe he has a fracture and would rather see sports medicine.   He explains if he would need an MRI, he would need to be sedated.   He has been taking predisone that has helped and isn't too concerned with it.    Rescheduled to sports medicine.       Reina Kelley MA, ATC

## 2022-12-13 ENCOUNTER — PRE VISIT (OUTPATIENT)
Dept: ORTHOPEDICS | Facility: CLINIC | Age: 77
End: 2022-12-13

## 2022-12-21 ENCOUNTER — OFFICE VISIT (OUTPATIENT)
Dept: ORTHOPEDICS | Facility: CLINIC | Age: 77
End: 2022-12-21
Payer: COMMERCIAL

## 2022-12-21 ENCOUNTER — PRE VISIT (OUTPATIENT)
Dept: ORTHOPEDICS | Facility: CLINIC | Age: 77
End: 2022-12-21

## 2022-12-21 ENCOUNTER — ANCILLARY PROCEDURE (OUTPATIENT)
Dept: GENERAL RADIOLOGY | Facility: CLINIC | Age: 77
End: 2022-12-21
Attending: PREVENTIVE MEDICINE
Payer: COMMERCIAL

## 2022-12-21 DIAGNOSIS — M51.16 LUMBAR DISC HERNIATION WITH RADICULOPATHY: ICD-10-CM

## 2022-12-21 DIAGNOSIS — M54.16 LUMBAR RADICULOPATHY: ICD-10-CM

## 2022-12-21 DIAGNOSIS — M54.16 LUMBAR RADICULOPATHY: Primary | ICD-10-CM

## 2022-12-21 PROCEDURE — 72100 X-RAY EXAM L-S SPINE 2/3 VWS: CPT | Performed by: RADIOLOGY

## 2022-12-21 PROCEDURE — 99204 OFFICE O/P NEW MOD 45 MIN: CPT | Performed by: PREVENTIVE MEDICINE

## 2022-12-21 RX ORDER — METHYLPREDNISOLONE 4 MG
TABLET, DOSE PACK ORAL
Qty: 21 TABLET | Refills: 0 | Status: SHIPPED | OUTPATIENT
Start: 2022-12-21 | End: 2023-04-11

## 2022-12-21 RX ORDER — METHOCARBAMOL 750 MG/1
750 TABLET, FILM COATED ORAL
Qty: 30 TABLET | Refills: 1 | Status: SHIPPED | OUTPATIENT
Start: 2022-12-21 | End: 2023-04-11

## 2022-12-21 RX ORDER — ETODOLAC 500 MG
500 TABLET ORAL 2 TIMES DAILY
Qty: 60 TABLET | Refills: 0 | Status: SHIPPED | OUTPATIENT
Start: 2022-12-21 | End: 2023-04-11

## 2022-12-21 NOTE — PROGRESS NOTES
HISTORY OF PRESENT ILLNESS  Mr. Parr is a pleasant 77 year old year old male who presents to clinic today with   Kendrick explains that he is here today to discuss his left hip pain.  Left leg sciatica    Location: left hip     Quality:  achy pain    Severity: 4/10 at worst    Duration: see above  Timing: occurs intermittently  Context: occurs while exercising and lifting and using the joint  Modifying factors:  resting and non-use makes it better, movement and use makes it worse  Associated signs & symptoms: radiation into leg    MEDICAL HISTORY  Patient Active Problem List   Diagnosis     ROBE (generalized anxiety disorder)     Hypercholesteremia     IFG (impaired fasting glucose)     Overweight (BMI 25.0-29.9)     BPH with obstruction/lower urinary tract symptoms       Current Outpatient Medications   Medication Sig Dispense Refill     atorvastatin (LIPITOR) 10 MG tablet TAKE ONE TABLET BY MOUTH ONCE DAILY 90 tablet 2     cyanocobalamin (VITAMIN  B-12) 1000 MCG tablet Take 2,000 mcg by mouth daily       diclofenac (VOLTAREN) 75 MG EC tablet Take 1 tablet (75 mg) by mouth 2 times daily as needed for moderate pain (4-6) 10 tablet 0     sertraline (ZOLOFT) 50 MG tablet TAKE ONE TABLET BY MOUTH ONCE DAILY 90 tablet 2     tamsulosin (FLOMAX) 0.4 MG capsule TAKE ONE CAPSULE BY MOUTH ONCE DAILY 90 capsule 2       No Known Allergies    Family History   Problem Relation Age of Onset     Alzheimer Disease Mother 86     Leukemia Father 86     Lymphoma Sister 51     Social History     Socioeconomic History     Marital status:    Tobacco Use     Smoking status: Never     Smokeless tobacco: Never   Substance and Sexual Activity     Alcohol use: Yes     Comment: occas     Drug use: Never       Additional medical/Social/Surgical histories reviewed in [facility] and updated as appropriate.     REVIEW OF SYSTEMS (12/21/2022)  10 point ROS of systems including Constitutional, Eyes, Respiratory, Cardiovascular, Gastroenterology,  Genitourinary, Integumentary, Musculoskeletal, Psychiatric, Allergic/Immunologic were all negative except for pertinent positives noted in my HPI.     PHYSICAL EXAM  VSS  Vital Signs: There were no vitals taken for this visit. Patient declined being weighed. There is no height or weight on file to calculate BMI.    General  - normal appearance, in no obvious distress  HEENT  - conjunctivae not injected, moist mucous membranes, normocephalic/atraumatic head, ears normal appearance, no lesions, mouth normal appearance, no scars, normal dentition and teeth present  CV  - normal peripheral perfusion  Pulm  - normal respiratory pattern, non-labored  Musculoskeletal - lumbar spine  - stance: normal gait without limp, no obvious leg length discrepancy, normal heel and toe walk  - inspection: normal bone and joint alignment, no obvious scoliosis  - palpation: no paravertebral or bony tenderness  - ROM: flexion exacerbates pain, normal extension, sidebending, rotation  - strength: lower extremities 5/5 in all planes  - special tests:  (+) straight leg raise- some left low back and left hip pain  (+) slump test  Neuro  - patellar and Achilles DTRs 2+ bilaterally, no lower extremity sensory deficit throughout L5 distribution, grossly normal coordination, normal muscle tone  Skin  - no ecchymosis, erythema, warmth, or induration, no obvious rash  Psych  - interactive, appropriate, normal mood and affect  Left hip: no pain with flexion/extension or rotation  ASSESSMENT & PLAN  76 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  Brodie Paul MD, CAM

## 2022-12-21 NOTE — LETTER
12/21/2022         RE: Kendrick Parr  [address]  [place] [address]        Dear Colleague,    Thank you for referring your patient, Kendrick Parr, to the [facility] [place]. Please see a copy of my visit note below.    HISTORY OF PRESENT ILLNESS  Mr. Parr is a pleasant 77 year old year old male who presents to clinic today with   Kendrick explains that he is here today to discuss his left hip pain.  Left leg sciatica    Location: left hip     Quality:  achy pain    Severity: 4/10 at worst    Duration: see above  Timing: occurs intermittently  Context: occurs while exercising and lifting and using the joint  Modifying factors:  resting and non-use makes it better, movement and use makes it worse  Associated signs & symptoms: radiation into leg    MEDICAL HISTORY  Patient Active Problem List   Diagnosis     ROBE (generalized anxiety disorder)     Hypercholesteremia     IFG (impaired fasting glucose)     Overweight (BMI 25.0-29.9)     BPH with obstruction/lower urinary tract symptoms       Current Outpatient Medications   Medication Sig Dispense Refill     atorvastatin (LIPITOR) 10 MG tablet TAKE ONE TABLET BY MOUTH ONCE DAILY 90 tablet 2     cyanocobalamin (VITAMIN  B-12) 1000 MCG tablet Take 2,000 mcg by mouth daily       diclofenac (VOLTAREN) 75 MG EC tablet Take 1 tablet (75 mg) by mouth 2 times daily as needed for moderate pain (4-6) 10 tablet 0     sertraline (ZOLOFT) 50 MG tablet TAKE ONE TABLET BY MOUTH ONCE DAILY 90 tablet 2     tamsulosin (FLOMAX) 0.4 MG capsule TAKE ONE CAPSULE BY MOUTH ONCE DAILY 90 capsule 2       No Known Allergies    Family History   Problem Relation Age of Onset     Alzheimer Disease Mother 86     Leukemia Father 86     Lymphoma Sister 51     Social History     Socioeconomic History     Marital status:    Tobacco Use     Smoking status: Never     Smokeless tobacco: Never   Substance and Sexual Activity     Alcohol use: Yes     Comment:  occas     Drug use: Never       Additional medical/Social/Surgical histories reviewed in [facility] and updated as appropriate.     REVIEW OF SYSTEMS (12/21/2022)  10 point ROS of systems including Constitutional, Eyes, Respiratory, Cardiovascular, Gastroenterology, Genitourinary, Integumentary, Musculoskeletal, Psychiatric, Allergic/Immunologic were all negative except for pertinent positives noted in my HPI.     PHYSICAL EXAM  VSS  Vital Signs: There were no vitals taken for this visit. Patient declined being weighed. There is no height or weight on file to calculate BMI.    General  - normal appearance, in no obvious distress  HEENT  - conjunctivae not injected, moist mucous membranes, normocephalic/atraumatic head, ears normal appearance, no lesions, mouth normal appearance, no scars, normal dentition and teeth present  CV  - normal peripheral perfusion  Pulm  - normal respiratory pattern, non-labored  Musculoskeletal - lumbar spine  - stance: normal gait without limp, no obvious leg length discrepancy, normal heel and toe walk  - inspection: normal bone and joint alignment, no obvious scoliosis  - palpation: no paravertebral or bony tenderness  - ROM: flexion exacerbates pain, normal extension, sidebending, rotation  - strength: lower extremities 5/5 in all planes  - special tests:  (+) straight leg raise- some left low back and left hip pain  (+) slump test  Neuro  - patellar and Achilles DTRs 2+ bilaterally, no lower extremity sensory deficit throughout L5 distribution, grossly normal coordination, normal muscle tone  Skin  - no ecchymosis, erythema, warmth, or induration, no obvious rash  Psych  - interactive, appropriate, normal mood and affect  Left hip: no pain with flexion/extension or rotation  ASSESSMENT & PLAN  76 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  Brodie Paul MD, CAM          Again, thank you for allowing me to participate in the care of your patient.        Sincerely,        Brodie Paul MD

## 2023-01-03 ENCOUNTER — VIRTUAL VISIT (OUTPATIENT)
Dept: ORTHOPEDICS | Facility: CLINIC | Age: 78
End: 2023-01-03
Payer: COMMERCIAL

## 2023-01-03 DIAGNOSIS — M54.16 LUMBAR RADICULOPATHY: Primary | ICD-10-CM

## 2023-01-03 PROCEDURE — 99442 PR PHYSICIAN TELEPHONE EVALUATION 11-20 MIN: CPT | Mod: 95 | Performed by: PREVENTIVE MEDICINE

## 2023-01-03 NOTE — PROGRESS NOTES
Patient is a   77  year old who is being evaluated via a billable telephone visit.      What phone number would you like to be contacted at? CELL  How would you like to obtain your AVS? EMMA        Subjective   Patient is a   77  year old who presents by phone call visit for the following:   F/u for lumbar radicular pain and hip pain  Doing better  Not resolved yet  HPI       Review of Systems   Constitutional, HEENT, cardiovascular, pulmonary, gi and gu systems are negative, except as otherwise noted.      Objective           Vitals:  No vitals were obtained today due to virtual visit.    Physical Exam   healthy, alert and no distress  PSYCH: Alert and oriented times 3; coherent speech, normal   rate and volume, able to articulate logical thoughts, able   to abstract reason, no tangential thoughts, no hallucinations   or delusions  His affect is normal  RESP: No cough, no audible wheezing, able to talk in full sentences  Remainder of exam unable to be completed due to telephone visits    Assessment/Plan  76 yo male with lumbar and hip pain, improved  Still having some pain, but better with medications  Refilled medications    I independently reviewed the following imaging studies and discussed with patient:      Lumbar xray: shows ddd  Consider lumbar MRI   If not improving  F/u 1 [facility]  Phone call duration: 20 minutes  Phone call start: 800am  Phone call end: 820am  Dr Paul

## 2023-01-03 NOTE — LETTER
1/3/2023         RE: Kendrick Parr  [address]  [place] [address]        Dear Colleague,    Thank you for referring your patient, Kendrick Parr, to the [facility] [place]. Please see a copy of my visit note below.    Patient is a   77  year old who is being evaluated via a billable telephone visit.      What phone number would you like to be contacted at? CELL  How would you like to obtain your AVS? MYCHART        Subjective   Patient is a   77  year old who presents by phone call visit for the following:   F/u for lumbar radicular pain and hip pain  Doing better  Not resolved yet  HPI       Review of Systems   Constitutional, HEENT, cardiovascular, pulmonary, gi and gu systems are negative, except as otherwise noted.      Objective           Vitals:  No vitals were obtained today due to virtual visit.    Physical Exam   healthy, alert and no distress  PSYCH: Alert and oriented times 3; coherent speech, normal   rate and volume, able to articulate logical thoughts, able   to abstract reason, no tangential thoughts, no hallucinations   or delusions  His affect is normal  RESP: No cough, no audible wheezing, able to talk in full sentences  Remainder of exam unable to be completed due to telephone visits    Assessment/Plan  78 yo male with lumbar and hip pain, improved  Still having some pain, but better with medications  Refilled medications    I independently reviewed the following imaging studies and discussed with patient:      Lumbar xray: shows ddd  Consider lumbar MRI   If not improving  F/u 1 [facility]  Phone call duration: 20 minutes  Phone call start: 800am  Phone call end: 820am  Dr Paul      Again, thank you for allowing me to participate in the care of your patient.        Sincerely,        Brodie Paul MD

## 2023-01-03 NOTE — LETTER
1/3/2023         RE: Kendrick Parr  [address]  [facility] [address]        Dear Colleague,    Thank you for referring your patient, Kendrick Parr, to the [facility] [place]. Please see a copy of my visit note below.    Patient is a   77  year old who is being evaluated via a billable telephone visit.      What phone number would you like to be contacted at? CELL  How would you like to obtain your AVS? MYCHART        Subjective   Patient is a   77  year old who presents by phone call visit for the following:   F/u for lumbar radicular pain and hip pain  Doing better  Not resolved yet  HPI       Review of Systems   Constitutional, HEENT, cardiovascular, pulmonary, gi and gu systems are negative, except as otherwise noted.      Objective           Vitals:  No vitals were obtained today due to virtual visit.    Physical Exam   healthy, alert and no distress  PSYCH: Alert and oriented times 3; coherent speech, normal   rate and volume, able to articulate logical thoughts, able   to abstract reason, no tangential thoughts, no hallucinations   or delusions  His affect is normal  RESP: No cough, no audible wheezing, able to talk in full sentences  Remainder of exam unable to be completed due to telephone visits    Assessment/Plan  78 yo male with lumbar and hip pain, improved  Still having some pain, but better with medications  Refilled medications    I independently reviewed the following imaging studies and discussed with patient:      Lumbar xray: shows ddd  Consider lumbar MRI   If not improving  F/u 1 [facility]  Phone call duration: 20 minutes  Phone call start: 800am  Phone call end: 820am  Dr Paul      Again, thank you for allowing me to participate in the care of your patient.        Sincerely,        Brodie Paul MD

## 2023-01-19 ENCOUNTER — VIRTUAL VISIT (OUTPATIENT)
Dept: ORTHOPEDICS | Facility: CLINIC | Age: 78
End: 2023-01-19
Payer: COMMERCIAL

## 2023-01-19 DIAGNOSIS — M51.16 LUMBAR DISC HERNIATION WITH RADICULOPATHY: ICD-10-CM

## 2023-01-19 DIAGNOSIS — M54.16 LUMBAR RADICULOPATHY: Primary | ICD-10-CM

## 2023-01-19 PROCEDURE — 99213 OFFICE O/P EST LOW 20 MIN: CPT | Mod: TEL | Performed by: PREVENTIVE MEDICINE

## 2023-01-19 NOTE — PROGRESS NOTES
Patient is a  77   year old who is being evaluated via a billable telephone visit.      What phone number would you like to be contacted at? CELL  How would you like to obtain your AVS? EMMA        Subjective   Patient is a  77   year old who presents by phone call visit for the following:   F/u for lumbar radicular pain  Resolved  No pain  Doing well    HPI       Review of Systems   Constitutional, HEENT, cardiovascular, pulmonary, gi and gu systems are negative, except as otherwise noted.      Objective           Vitals:  No vitals were obtained today due to virtual visit.    Physical Exam   healthy, alert and no distress  PSYCH: Alert and oriented times 3; coherent speech, normal   rate and volume, able to articulate logical thoughts, able   to abstract reason, no tangential thoughts, no hallucinations   or delusions  His affect is normal  RESP: No cough, no audible wheezing, able to talk in full sentences  Remainder of exam unable to be completed due to telephone visits    Assessment/Plan  78 yo male with lumbar ddd, radicular pain  Resolved  F/u PRN, will plan on lumbar MRI if symptoms return    I independently reviewed the following imaging studies and discussed with patient:    Lumbar xrays: shows ddd  Cont. HEP    Phone call duration: 20 minutes  Phone call start: 1120am  Phone call end: 1140am  Dr Paul

## 2023-01-19 NOTE — LETTER
1/19/2023         RE: Kendrick Parr  [address]  [place] [facility] [address]        Dear Colleague,    Thank you for referring your patient, Kendrick Parr, to the [facility] [place]. Please see a copy of my visit note below.    Patient is a  77   year old who is being evaluated via a billable telephone visit.      What phone number would you like to be contacted at? CELL  How would you like to obtain your AVS? MYCHART        Subjective   Patient is a  77   year old who presents by phone call visit for the following:   F/u for lumbar radicular pain  Resolved  No pain  Doing well    HPI       Review of Systems   Constitutional, HEENT, cardiovascular, pulmonary, gi and gu systems are negative, except as otherwise noted.      Objective           Vitals:  No vitals were obtained today due to virtual visit.    Physical Exam   healthy, alert and no distress  PSYCH: Alert and oriented times 3; coherent speech, normal   rate and volume, able to articulate logical thoughts, able   to abstract reason, no tangential thoughts, no hallucinations   or delusions  His affect is normal  RESP: No cough, no audible wheezing, able to talk in full sentences  Remainder of exam unable to be completed due to telephone visits    Assessment/Plan  78 yo male with lumbar ddd, radicular pain  Resolved  F/u PRN, will plan on lumbar MRI if symptoms return    I independently reviewed the following imaging studies and discussed with patient:    Lumbar xrays: shows ddd  Cont. HEP    Phone call duration: 20 minutes  Phone call start: 1120am  Phone call end: 1140am  Dr Paul      Again, thank you for allowing me to participate in the care of your patient.        Sincerely,        Brodie Paul MD

## 2023-01-19 NOTE — LETTER
1/19/2023         RE: Kendrick Parr  [address]  [place] [facility] [address]        Dear Colleague,    Thank you for referring your patient, Kendrick Parr, to the [facility] [place]. Please see a copy of my visit note below.    Patient is a  77   year old who is being evaluated via a billable telephone visit.      What phone number would you like to be contacted at? CELL  How would you like to obtain your AVS? MYCHART        Subjective   Patient is a  77   year old who presents by phone call visit for the following:   F/u for lumbar radicular pain  Resolved  No pain  Doing well    HPI       Review of Systems   Constitutional, HEENT, cardiovascular, pulmonary, gi and gu systems are negative, except as otherwise noted.      Objective           Vitals:  No vitals were obtained today due to virtual visit.    Physical Exam   healthy, alert and no distress  PSYCH: Alert and oriented times 3; coherent speech, normal   rate and volume, able to articulate logical thoughts, able   to abstract reason, no tangential thoughts, no hallucinations   or delusions  His affect is normal  RESP: No cough, no audible wheezing, able to talk in full sentences  Remainder of exam unable to be completed due to telephone visits    Assessment/Plan  78 yo male with lumbar ddd, radicular pain  Resolved  F/u PRN, will plan on lumbar MRI if symptoms return    I independently reviewed the following imaging studies and discussed with patient:    Lumbar xrays: shows ddd  Cont. HEP    Phone call duration: 20 minutes  Phone call start: 1120am  Phone call end: 1140am  Dr Paul      Again, thank you for allowing me to participate in the care of your patient.        Sincerely,        Brodie Paul MD     Detail Level: Simple

## 2023-02-23 ENCOUNTER — TELEPHONE (OUTPATIENT)
Dept: FAMILY MEDICINE | Facility: CLINIC | Age: 78
End: 2023-02-23
Payer: COMMERCIAL

## 2023-02-23 DIAGNOSIS — E78.00 HYPERCHOLESTEREMIA: ICD-10-CM

## 2023-02-23 DIAGNOSIS — F41.1 GAD (GENERALIZED ANXIETY DISORDER): ICD-10-CM

## 2023-02-23 RX ORDER — ATORVASTATIN CALCIUM 10 MG/1
10 TABLET, FILM COATED ORAL DAILY
Qty: 90 TABLET | Refills: 0 | Status: SHIPPED | OUTPATIENT
Start: 2023-02-23 | End: 2023-07-05

## 2023-02-23 NOTE — TELEPHONE ENCOUNTER
Pt has Apt scheduled for 4/11/23 Pt would like labs ordered before hand as well.      Haylee CRAWFORD Visit Facilitator

## 2023-02-24 NOTE — TELEPHONE ENCOUNTER
Called Pt to inform about note below Pt appreciated the call.      Haylee CRAWFORD Visit Facilitator     
Pt called and needs natalia refill of the sertraline (ZOLOFT) 50 MG tablet, atorvastatin (LIPITOR) 10 MG tablet until pt makes Apt     Please advise       Haylee CRAWFORD Visit Facilitator     
Pupils equal, round and reactive to light, Extra-ocular movement intact, eyes are clear b/l

## 2023-04-10 PROBLEM — M47.816 LUMBAR FACET ARTHROPATHY: Status: ACTIVE | Noted: 2023-04-10

## 2023-04-11 ENCOUNTER — ANCILLARY PROCEDURE (OUTPATIENT)
Dept: GENERAL RADIOLOGY | Facility: CLINIC | Age: 78
End: 2023-04-11
Attending: INTERNAL MEDICINE
Payer: COMMERCIAL

## 2023-04-11 ENCOUNTER — OFFICE VISIT (OUTPATIENT)
Dept: FAMILY MEDICINE | Facility: CLINIC | Age: 78
End: 2023-04-11
Payer: COMMERCIAL

## 2023-04-11 VITALS
RESPIRATION RATE: 24 BRPM | HEART RATE: 70 BPM | WEIGHT: 213 LBS | DIASTOLIC BLOOD PRESSURE: 77 MMHG | TEMPERATURE: 97.7 F | OXYGEN SATURATION: 95 % | HEIGHT: 72 IN | SYSTOLIC BLOOD PRESSURE: 145 MMHG | BODY MASS INDEX: 28.85 KG/M2

## 2023-04-11 DIAGNOSIS — E78.00 HYPERCHOLESTEREMIA: ICD-10-CM

## 2023-04-11 DIAGNOSIS — Z00.00 ENCOUNTER FOR MEDICARE ANNUAL WELLNESS EXAM: Primary | ICD-10-CM

## 2023-04-11 DIAGNOSIS — F41.1 GAD (GENERALIZED ANXIETY DISORDER): ICD-10-CM

## 2023-04-11 DIAGNOSIS — E66.3 OVERWEIGHT (BMI 25.0-29.9): ICD-10-CM

## 2023-04-11 DIAGNOSIS — S99.912A ANKLE INJURY, LEFT, INITIAL ENCOUNTER: ICD-10-CM

## 2023-04-11 DIAGNOSIS — N13.8 BPH WITH OBSTRUCTION/LOWER URINARY TRACT SYMPTOMS: ICD-10-CM

## 2023-04-11 DIAGNOSIS — R06.09 EXERTIONAL DYSPNEA: ICD-10-CM

## 2023-04-11 DIAGNOSIS — R53.83 FATIGUE, UNSPECIFIED TYPE: ICD-10-CM

## 2023-04-11 DIAGNOSIS — N40.1 BPH WITH OBSTRUCTION/LOWER URINARY TRACT SYMPTOMS: ICD-10-CM

## 2023-04-11 DIAGNOSIS — R73.01 IFG (IMPAIRED FASTING GLUCOSE): ICD-10-CM

## 2023-04-11 LAB
ALBUMIN SERPL-MCNC: 3.8 G/DL (ref 3.4–5)
ALP SERPL-CCNC: 69 U/L (ref 40–150)
ALT SERPL W P-5'-P-CCNC: 28 U/L (ref 0–70)
ANION GAP SERPL CALCULATED.3IONS-SCNC: <1 MMOL/L (ref 3–14)
AST SERPL W P-5'-P-CCNC: 15 U/L (ref 0–45)
BILIRUB SERPL-MCNC: 0.6 MG/DL (ref 0.2–1.3)
BUN SERPL-MCNC: 14 MG/DL (ref 7–30)
CALCIUM SERPL-MCNC: 8.9 MG/DL (ref 8.5–10.1)
CHLORIDE BLD-SCNC: 111 MMOL/L (ref 94–109)
CHOLEST SERPL-MCNC: 132 MG/DL
CO2 SERPL-SCNC: 32 MMOL/L (ref 20–32)
CREAT SERPL-MCNC: 0.83 MG/DL (ref 0.66–1.25)
ERYTHROCYTE [DISTWIDTH] IN BLOOD BY AUTOMATED COUNT: 12.7 % (ref 10–15)
FASTING STATUS PATIENT QL REPORTED: YES
GFR SERPL CREATININE-BSD FRML MDRD: >90 ML/MIN/1.73M2
GLUCOSE BLD-MCNC: 139 MG/DL (ref 70–99)
HBA1C MFR BLD: 6.3 % (ref 0–5.6)
HCT VFR BLD AUTO: 42.4 % (ref 40–53)
HDLC SERPL-MCNC: 50 MG/DL
HGB BLD-MCNC: 14.1 G/DL (ref 13.3–17.7)
LDLC SERPL CALC-MCNC: 65 MG/DL
MCH RBC QN AUTO: 30.8 PG (ref 26.5–33)
MCHC RBC AUTO-ENTMCNC: 33.3 G/DL (ref 31.5–36.5)
MCV RBC AUTO: 93 FL (ref 78–100)
NONHDLC SERPL-MCNC: 82 MG/DL
PLATELET # BLD AUTO: 190 10E3/UL (ref 150–450)
POTASSIUM BLD-SCNC: 5 MMOL/L (ref 3.4–5.3)
PROT SERPL-MCNC: 7 G/DL (ref 6.8–8.8)
RBC # BLD AUTO: 4.58 10E6/UL (ref 4.4–5.9)
SODIUM SERPL-SCNC: 142 MMOL/L (ref 133–144)
TRIGL SERPL-MCNC: 83 MG/DL
TSH SERPL DL<=0.005 MIU/L-ACNC: 1.85 MU/L (ref 0.4–4)
VIT B12 SERPL-MCNC: 419 PG/ML (ref 232–1245)
WBC # BLD AUTO: 6.6 10E3/UL (ref 4–11)

## 2023-04-11 PROCEDURE — 84443 ASSAY THYROID STIM HORMONE: CPT | Performed by: INTERNAL MEDICINE

## 2023-04-11 PROCEDURE — 93000 ELECTROCARDIOGRAM COMPLETE: CPT | Performed by: INTERNAL MEDICINE

## 2023-04-11 PROCEDURE — G0439 PPPS, SUBSEQ VISIT: HCPCS | Performed by: INTERNAL MEDICINE

## 2023-04-11 PROCEDURE — 82607 VITAMIN B-12: CPT | Performed by: INTERNAL MEDICINE

## 2023-04-11 PROCEDURE — 80053 COMPREHEN METABOLIC PANEL: CPT | Performed by: INTERNAL MEDICINE

## 2023-04-11 PROCEDURE — 99214 OFFICE O/P EST MOD 30 MIN: CPT | Mod: 25 | Performed by: INTERNAL MEDICINE

## 2023-04-11 PROCEDURE — 36415 COLL VENOUS BLD VENIPUNCTURE: CPT | Performed by: INTERNAL MEDICINE

## 2023-04-11 PROCEDURE — 83036 HEMOGLOBIN GLYCOSYLATED A1C: CPT | Performed by: INTERNAL MEDICINE

## 2023-04-11 PROCEDURE — 80061 LIPID PANEL: CPT | Performed by: INTERNAL MEDICINE

## 2023-04-11 PROCEDURE — 85027 COMPLETE CBC AUTOMATED: CPT | Performed by: INTERNAL MEDICINE

## 2023-04-11 PROCEDURE — 73610 X-RAY EXAM OF ANKLE: CPT | Mod: TC | Performed by: STUDENT IN AN ORGANIZED HEALTH CARE EDUCATION/TRAINING PROGRAM

## 2023-04-11 ASSESSMENT — ENCOUNTER SYMPTOMS
COUGH: 0
DIZZINESS: 1
JOINT SWELLING: 0
DIARRHEA: 0
HEMATURIA: 0
HEMATOCHEZIA: 0
ARTHRALGIAS: 1
CHILLS: 0
DYSURIA: 0
MYALGIAS: 0
SORE THROAT: 0
FREQUENCY: 1
PALPITATIONS: 0
NAUSEA: 0
ABDOMINAL PAIN: 0
SHORTNESS OF BREATH: 0
HEARTBURN: 0
CONSTIPATION: 0
PARESTHESIAS: 0
EYE PAIN: 0
WEAKNESS: 0
NERVOUS/ANXIOUS: 1
FEVER: 0
HEADACHES: 0

## 2023-04-11 ASSESSMENT — PAIN SCALES - GENERAL: PAINLEVEL: NO PAIN (0)

## 2023-04-11 ASSESSMENT — ACTIVITIES OF DAILY LIVING (ADL): CURRENT_FUNCTION: NO ASSISTANCE NEEDED

## 2023-04-11 NOTE — PATIENT INSTRUCTIONS
Patient Education   Personalized Prevention Plan  You are due for the preventive services outlined below.  Your care team is available to assist you in scheduling these services.  If you have already completed any of these items, please share that information with your care team to update in your medical record.  Health Maintenance Due   Topic Date Due     Hepatitis C Screening  Never done     Zoster (Shingles) Vaccine (2 of 3) 12/30/2015     COVID-19 Vaccine (3 - Booster for Pfizer series) 07/01/2021     Flu Vaccine (1) 09/01/2022     Annual Wellness Visit  02/22/2023     FALL RISK ASSESSMENT  02/22/2023

## 2023-04-11 NOTE — PROGRESS NOTES
"SUBJECTIVE:   Marcellus is a 78 year old who presents for Preventive Visit.    78-year-old presents for annual physical examination.  He has a history of hypercholesterolemia, anxiety disorder, impaired fasting glucose and BPH.  He is still working full-time.  He indicates that this winter while walking his dog outside he had fallen 6 times.  About 5 weeks ago he injured his left ankle and is still swollen.  Some pain or discomfort associated with it.  He is able to bear weight and walk.  Also he has noticed in the past year some exertional shortness of breath when climbing stairs.  Does not notice it on normal walking.  He denies dizziness or lightheadedness.  No associated chest pain or chest tightness.  He also has been feeling more fatigue in the past year.  Energy level is low.  Denies anxiety or depression.        4/11/2023     6:47 AM   Additional Questions   Roomed by aldo lópez   Accompanied by self         4/11/2023     6:47 AM   Patient Reported Additional Medications   Patient reports taking the following new medications see chart   Patient has been advised of split billing requirements and indicates understanding: Yes  Are you in the first 12 months of your Medicare coverage?  No    Healthy Habits:     In general, how would you rate your overall health?  Good    Frequency of exercise:  2-3 days/week    Duration of exercise:  Less than 15 minutes    Do you usually eat at least 4 servings of fruit and vegetables a day, include whole grains    & fiber and avoid regularly eating high fat or \"junk\" foods?  No    Taking medications regularly:  Yes    Medication side effects:  None    Ability to successfully perform activities of daily living:  No assistance needed    Home Safety:  Lack of grab bars in the bathroom    Hearing Impairment:  Difficulty following a conversation in a noisy restaurant or crowded room, difficult to understand a speaker at a public meeting or Confucianism service, need to ask people to speak up " or repeat themselves, find that men's voices are easier to understand than woman's, difficulty understanding soft or whispered speech and difficulty understanding speech on the telephone    In the past 6 months, have you been bothered by leaking of urine? Yes    In general, how would you rate your overall mental or emotional health?  Good      PHQ-2 Total Score: 2    Additional concerns today:  Yes      Have you ever done Advance Care Planning? (For example, a Health Directive, POLST, or a discussion with a medical provider or your loved ones about your wishes): No, advance care planning information given to patient to review.  Patient declined advance care planning discussion at this time.       Fall risk  Fallen 2 or more times in the past year?: Yes  Any fall with injury in the past year?: Yes    Cognitive Screening   1) Repeat 3 items (Leader, Season, Table)    2) Clock draw: NORMAL  3) 3 item recall: Recalls 3 objects  Results: 3 items recalled: COGNITIVE IMPAIRMENT LESS LIKELY    Mini-CogTM Copyright VARUN Delacruz. Licensed by the author for use in [facility]; reprinted with permission (felipe@[facility]). All rights reserved.      Do you have sleep apnea, excessive snoring or daytime drowsiness?: no    Reviewed and updated as needed this visit by clinical staff   Tobacco  Allergies               Reviewed and updated as needed this visit by Provider                 Social History     Tobacco Use     Smoking status: Never     Smokeless tobacco: Never   Vaping Use     Vaping status: Never Used   Substance Use Topics     Alcohol use: Yes     Comment: billy             4/11/2023     6:53 AM   Alcohol Use   Prescreen: >3 drinks/day or >7 drinks/week? No     Do you have a current opioid prescription? No  Do you use any other controlled substances or medications that are not prescribed by a provider? None      Current providers sharing in care for this patient include:   Patient Care Team:  No Ref-Primary,  Physician as PCP - General  Rakesh Santoro MD as Assigned PCP  Brodie Paul MD as Assigned Musculoskeletal Provider    The following health maintenance items are reviewed in Epic and correct as of today:  Health Maintenance   Topic Date Due     HEPATITIS C SCREENING  Never done     ZOSTER IMMUNIZATION (2 of 3) 12/30/2015     COVID-19 Vaccine (3 - Booster for Pfizer series) 07/01/2021     INFLUENZA VACCINE (1) 09/01/2022     MEDICARE ANNUAL WELLNESS VISIT  02/22/2023     ANNUAL REVIEW OF HM ORDERS  12/06/2023     FALL RISK ASSESSMENT  04/11/2024     LIPID  02/14/2027     ADVANCE CARE PLANNING  02/22/2027     DTAP/TDAP/TD IMMUNIZATION (4 - Td or Tdap) 06/15/2028     COLORECTAL CANCER SCREENING  01/04/2031     PHQ-2 (once per calendar year)  Completed     Pneumococcal Vaccine: 65+ Years  Completed     IPV IMMUNIZATION  Aged Out     MENINGITIS IMMUNIZATION  Aged Out     BP Readings from Last 3 Encounters:   04/11/23 (!) 145/77   12/06/22 130/81   12/02/22 (!) 168/70    Wt Readings from Last 3 Encounters:   04/11/23 96.6 kg (213 lb)   12/06/22 94.3 kg (208 lb)   12/02/22 91.8 kg (202 lb 6.4 oz)                  Patient Active Problem List   Diagnosis     ROBE (generalized anxiety disorder)     Hypercholesteremia     IFG (impaired fasting glucose)     Overweight (BMI 25.0-29.9)     BPH with obstruction/lower urinary tract symptoms     Lumbar facet arthropathy     Past Surgical History:   Procedure Laterality Date     APPENDECTOMY  1960     COLONOSCOPY WITH CO2 INSUFFLATION N/A 1/4/2021    Procedure: COLONOSCOPY, WITH CO2 INSUFFLATION;  Surgeon: Livier Nguyen DO;  Location: [facility] OR     OPEN REDUCTION INTERNAL FIXATION FOREARM  2017       Social History     Tobacco Use     Smoking status: Never     Smokeless tobacco: Never   Vaping Use     Vaping status: Never Used   Substance Use Topics     Alcohol use: Yes     Comment: occas     Family History   Problem Relation Age of Onset     Alzheimer Disease Mother 86      "Leukemia Father 86     Lymphoma Sister 51         Current Outpatient Medications   Medication Sig Dispense Refill     atorvastatin (LIPITOR) 10 MG tablet Take 1 tablet (10 mg) by mouth daily 90 tablet 0     sertraline (ZOLOFT) 50 MG tablet Take 1 tablet (50 mg) by mouth daily 90 tablet 0     tamsulosin (FLOMAX) 0.4 MG capsule TAKE ONE CAPSULE BY MOUTH ONCE DAILY 90 capsule 2     Allergies   Allergen Reactions     Etodolac Rash       Review of Systems   Constitutional: Negative for chills and fever.   HENT: Positive for hearing loss. Negative for congestion, ear pain and sore throat.    Eyes: Negative for pain and visual disturbance.   Respiratory: Negative for cough and shortness of breath.    Cardiovascular: Negative for chest pain, palpitations and peripheral edema.   Gastrointestinal: Negative for abdominal pain, constipation, diarrhea, heartburn, hematochezia and nausea.   Genitourinary: Positive for frequency, impotence and urgency. Negative for dysuria, genital sores, hematuria and penile discharge.   Musculoskeletal: Positive for arthralgias. Negative for joint swelling and myalgias.   Skin: Negative for rash.   Neurological: Positive for dizziness. Negative for weakness, headaches and paresthesias.   Psychiatric/Behavioral: Negative for mood changes. The patient is nervous/anxious.        OBJECTIVE:   BP (!) 145/77   Pulse 70   Temp 97.7  F (36.5  C) (Tympanic)   Resp 24   Ht 1.835 m (6' 0.25\")   Wt 96.6 kg (213 lb)   SpO2 95%   BMI 28.69 kg/m   Estimated body mass index is 28.69 kg/m  as calculated from the following:    Height as of this encounter: 1.835 m (6' 0.25\").    Weight as of this encounter: 96.6 kg (213 lb).  Physical Exam  GENERAL: healthy, alert and no distress  EYES: Eyes grossly normal to inspection, PERRL and conjunctivae and sclerae normal  HENT: ear canals and TM's normal, nose and mouth without ulcers or lesions  NECK: no adenopathy, no asymmetry, masses, or scars and thyroid normal " to palpation  RESP: lungs clear to auscultation - no rales, rhonchi or wheezes  CV: regular rate and rhythm, normal S1 S2, no S3 or S4, no murmur, click or rub, no peripheral edema and peripheral pulses strong  ABDOMEN: soft, nontender, no hepatosplenomegaly, no masses and bowel sounds normal   (male): normal male genitalia without lesions or urethral discharge, no hernia  RECTAL: normal sphincter tone, no rectal masses, prostate normal size, smooth, nontender without nodules or masses  MS: Left ankle examination reveals some swelling particularly lateral malleolus with mild tenderness on palpation.  But the range of motion at the ankle is normal.  SKIN: no suspicious lesions or rashes  NEURO: Normal strength and tone, mentation intact and speech normal  PSYCH: mentation appears normal, affect normal/bright  LYMPH: no cervical, supraclavicular, axillary, or inguinal adenopathy    EKG performed today shows evidence of sinus rhythm with right bundle branch block.    ASSESSMENT / PLAN:     1.  Encounter for Medicare annual wellness visit.  Exam overall good.  2.  Fatigue for the past year.  Will investigate further with comprehensive lab including TSH and vitamin B12.  3.  Exertional dyspnea with certain activities such as climbing stairs.  EKG performed today show evidence of sinus rhythm with right bundle branch block.  Will investigate further with stress echocardiogram.  4.  Ankle injury left 5 weeks ago.  Still some swelling.  We will proceed with x-ray of the ankle and I will get back to the results.  5.  Hypercholesterolemia been treated with atorvastatin 10 mg a day.  We will check lipids today and get back on the results.  6.  Impaired fasting glucose.  I will be checking fasting blood sugar and A1c today.  I will get back to her with results and recommendation.  7.  Generalized anxiety disorder well-controlled with sertraline 50 mg a day.  Continue same.  8.  Benign prostate hypertrophy with lower urinary  "symptoms well controlled with tamsulosin 0.4 mg.  He will continue same.  9.  Overweight with BMI of around 28.  Regular exercise and diet discussed.    I will get back to her with the lab results, EKG report and the x-ray report of the ankle.    Patient has been advised of split billing requirements and indicates understanding: Yes      COUNSELING:  Reviewed preventive health counseling, as reflected in patient instructions       Regular exercise       Healthy diet/nutrition       Vision screening      BMI:   Estimated body mass index is 28.69 kg/m  as calculated from the following:    Height as of this encounter: 1.835 m (6' 0.25\").    Weight as of this encounter: 96.6 kg (213 lb).   Weight management plan: Discussed healthy diet and exercise guidelines      He reports that he has never smoked. He has never used smokeless tobacco.      Appropriate preventive services were discussed with this patient, including applicable screening as appropriate for cardiovascular disease, diabetes, osteopenia/osteoporosis, and glaucoma.  As appropriate for age/gender, discussed screening for colorectal cancer, prostate cancer, breast cancer, and cervical cancer. Checklist reviewing preventive services available has been given to the patient.    Reviewed patients plan of care and provided an AVS. The Basic Care Plan (routine screening as documented in Health Maintenance) for Kendrick meets the Care Plan requirement. This Care Plan has been established and reviewed with the Patient.      Rakesh Santoro MD  [facility]    Identified Health Risks:    "

## 2023-05-16 ENCOUNTER — ANCILLARY PROCEDURE (OUTPATIENT)
Dept: CARDIOLOGY | Facility: CLINIC | Age: 78
End: 2023-05-16
Attending: INTERNAL MEDICINE
Payer: COMMERCIAL

## 2023-05-16 DIAGNOSIS — R06.09 EXERTIONAL DYSPNEA: ICD-10-CM

## 2023-05-16 PROCEDURE — 93017 CV STRESS TEST TRACING ONLY: CPT | Performed by: INTERNAL MEDICINE

## 2023-05-16 PROCEDURE — 93321 DOPPLER ECHO F-UP/LMTD STD: CPT | Performed by: INTERNAL MEDICINE

## 2023-05-16 PROCEDURE — 93325 DOPPLER ECHO COLOR FLOW MAPG: CPT | Performed by: INTERNAL MEDICINE

## 2023-05-16 PROCEDURE — 93350 STRESS TTE ONLY: CPT | Performed by: INTERNAL MEDICINE

## 2023-05-16 PROCEDURE — 93018 CV STRESS TEST I&R ONLY: CPT | Performed by: INTERNAL MEDICINE

## 2023-05-16 PROCEDURE — 93016 CV STRESS TEST SUPVJ ONLY: CPT | Performed by: INTERNAL MEDICINE

## 2023-05-16 RX ADMIN — Medication 5 ML: at 11:55

## 2023-06-01 ENCOUNTER — OFFICE VISIT (OUTPATIENT)
Dept: CARDIOLOGY | Facility: CLINIC | Age: 78
End: 2023-06-01
Payer: COMMERCIAL

## 2023-06-01 VITALS
SYSTOLIC BLOOD PRESSURE: 150 MMHG | DIASTOLIC BLOOD PRESSURE: 75 MMHG | HEART RATE: 64 BPM | OXYGEN SATURATION: 97 % | WEIGHT: 214 LBS | BODY MASS INDEX: 28.82 KG/M2

## 2023-06-01 DIAGNOSIS — E78.5 HYPERLIPIDEMIA LDL GOAL <100: ICD-10-CM

## 2023-06-01 DIAGNOSIS — R94.31 NONSPECIFIC ABNORMAL ELECTROCARDIOGRAM (ECG) (EKG): ICD-10-CM

## 2023-06-01 DIAGNOSIS — R94.30 ABNORMAL CARDIOVASCULAR FUNCTION STUDY: ICD-10-CM

## 2023-06-01 DIAGNOSIS — I35.0 NONRHEUMATIC AORTIC (VALVE) STENOSIS: Primary | ICD-10-CM

## 2023-06-01 DIAGNOSIS — Z82.49 FAMILY HISTORY OF PREMATURE CORONARY ARTERY DISEASE: ICD-10-CM

## 2023-06-01 PROCEDURE — 99204 OFFICE O/P NEW MOD 45 MIN: CPT | Performed by: INTERNAL MEDICINE

## 2023-06-01 NOTE — PROGRESS NOTES
SUBJECTIVE:  Kendrick Parr is a 78 year old male who presents for cardiac evaluation due to an abnormal stress test done recently.  Patient with risk factors of hyperlipidemia and family history of premature coronary artery disease.  Because of family history of premature coronary artery disease patient was advised to have an exercise stress test.  He currently is only 76% maximum predicted heart rate.  Test was normal at the achieved heart rate and workload.  But baseline screening echocardiogram showed moderate aortic stenosis.  Currently patient is not very active but has no cardiac symptoms.  He has no prior cardiac history.  Only current cardiac medication is Lipitor 10 mg daily.  No history of diabetes or hypertension.  Never smoked.  Patient used to be very active in the past exercising regularly at the gym as well as biking.  Currently his maximum activity is taking the dog for a walk.  Patient was an active  in the past.  Currently he will go to the [employer] job.  Patient Active Problem List    Diagnosis Date Noted     Lumbar facet arthropathy 04/10/2023     Priority: Medium     IFG (impaired fasting glucose) 12/01/2020     Priority: Medium     Overweight (BMI 25.0-29.9) 12/01/2020     Priority: Medium     BPH with obstruction/lower urinary tract symptoms 12/01/2020     Priority: Medium     ROBE (generalized anxiety disorder)      Priority: Medium     Hypercholesteremia      Priority: Medium    .  Current Outpatient Medications   Medication Sig     atorvastatin (LIPITOR) 10 MG tablet Take 1 tablet (10 mg) by mouth daily     sertraline (ZOLOFT) 50 MG tablet Take 1 tablet (50 mg) by mouth daily     tamsulosin (FLOMAX) 0.4 MG capsule TAKE ONE CAPSULE BY MOUTH ONCE DAILY     No current facility-administered medications for this visit.     Past Medical History:   Diagnosis Date     BPH with obstruction/lower urinary tract symptoms 12/1/2020     ROBE (generalized anxiety disorder)       Hypercholesteremia      IFG (impaired fasting glucose) 12/1/2020     Lumbar facet arthropathy 4/10/2023     Mild cognitive impairment 5/1/2012     Overweight (BMI 25.0-29.9) 12/1/2020     Past Surgical History:   Procedure Laterality Date     APPENDECTOMY  1960     COLONOSCOPY WITH CO2 INSUFFLATION N/A 1/4/2021    Procedure: COLONOSCOPY, WITH CO2 INSUFFLATION;  Surgeon: Livier Nguyen DO;  Location: [facility] OR     OPEN REDUCTION INTERNAL FIXATION FOREARM  2017     Allergies   Allergen Reactions     Etodolac Rash     Social History     Socioeconomic History     Marital status:      Spouse name: Not on file     Number of children: Not on file     Years of education: Not on file     Highest education level: Not on file   Occupational History     Not on file   Tobacco Use     Smoking status: Never     Smokeless tobacco: Never   Vaping Use     Vaping status: Never Used   Substance and Sexual Activity     Alcohol use: Yes     Comment: occas     Drug use: Never     Sexual activity: Not on file   Other Topics Concern     Not on file   Social History Narrative     Not on file     Social Determinants of Health     Financial Resource Strain: Not on file   Food Insecurity: Not on file   Transportation Needs: Not on file   Physical Activity: Not on file   Stress: Not on file   Social Connections: Not on file   Intimate Partner Violence: Not on file   Housing Stability: Not on file     Family History   Problem Relation Age of Onset     Alzheimer Disease Mother 86     Leukemia Father 86     Lymphoma Sister 51          REVIEW OF SYSTEMS:  General: negative, fever, chills, night sweats  Skin: negative, acne, rash and scaling  Eyes: negative, double vision, eye pain and photophobia  Ears/Nose/Throat: negative, nasal congestion and purulent rhinorrhea  Respiratory: No dyspnea on exertion, No cough, No hemoptysis and negative  Cardiovascular: negative, palpitations, tachycardia, irregular heart beat, chest pain, exertional chest  pain or pressure, paroxysmal nocturnal dyspnea, dyspnea on exertion and orthopnea       OBJECTIVE:  Blood pressure (!) 150/75, pulse 64, weight 97.1 kg (214 lb), SpO2 97 %.  General Appearance: healthy, alert, active and no distress  Head: Normocephalic. No masses, lesions, tenderness or abnormalities  Eyes: conjuctiva clear, PERRL, EOM intact  Ears: External ears normal. Canals clear. TM's normal.  Nose: Nares normal  Mouth: normal  Neck: Supple, no cervical adenopathy, no thyromegaly  Lungs: clear to auscultation  Cardiac: regular rate and rhythm, normal S1 and S2, ESM.     ASSESSMENT/PLAN:  Patient here for cardiac evaluation due to an abnormal stress test.  Patient with family history of premature coronary artery disease.  Because of this patient had an exercise stress test.  Achieved only 76% maximum predicted heart rate.  Test was normal at this workload and heart rate but echocardiogram showed moderate aortic stenosis with a peak velocity of 2.9 m/s and mean gradient of 20 mmHg.  Patient's cardiac risk factors are hyperlipidemia on treatment with statin and family history of premature coronary artery disease.  Never smoked.  No diabetes or hypertension.  Patient's EKG reviewed normal sinus rhythm right bundle branch block otherwise unremarkable.  Patient's stress echo reviewed normal biventricular function.  No significant valvular abnormality apart from aortic stenosis.  As the stress test was incomplete we will plan for a Lexiscan to complete the CAD evaluation.  Discussed aortic stenosis.  Currently with severe history of ASD is not significant enough to cause any symptoms.  He will return to clinic in 1 year with a repeat echocardiogram to reassess aortic stenosis.  Meanwhile he is advised to continue his atorvastatin 10 mg daily.  No activity restriction is advised.  Total visit duration 45 minutes.  This included face-to-face interview, physical exam, chart review, review of EKG echocardiogram, Care  Everywhere and documentation.

## 2023-06-01 NOTE — PATIENT INSTRUCTIONS
"Thank you for coming to the [facility] @ [facility]; please note the following instructions:    1. Lexiscan    Information for Lexiscan Test     Test can take up to 4 hours.  Nothing to eat 3 hours prior.  Water is permitted.  No caffiene 12 hours prior.  Wear comfortable clothing.     Cardiac nuclear imaging measures the flow of blood in your heart at rest and then during exercise. The images are compared to determine whether there are any blockages in the arteries, changes in blood flow or oxygen supply from resting to the stressed state, areas of scar tissue, or if there has been a prior heart attack. It also measures how well the heart muscle squeezes and pumps. The test is also sometimes called a \"perfusion scan\" or a \"SPECT MPI\" (single photon emission computed tomography myocardial perfusion imaging). For the scan, a small amount of radioactive material called \"tracer\" is delivered into the bloodstream. A special camera then scans the tracer in the blood as it flows through the heart muscle. Areas of the heart that have good blood flow absorb the tracer. Areas that are not getting enough blood will not absorb the tracer. This can be a sign of a blocked artery, vessel narrowing, or any area of the heart not receiving blood, perhaps as a result of damage from a heart attack. The tracer leaves your body within hours. This test can be done in a hospital or test center.     During your test  Here is what to expect during your test:              You may be asked to change into a hospital gown from the waist up.              You will be attached to EKG, which monitors your heart rhythm, and blood pressure monitors. An IV (intravenous) line will be started in your arm.              At some point, scanning pictures will be taken while you rest. This may be done before you exercise or you may be asked to return for resting scans later that day or the next.              You will exercise on a " treadmill or stationary bike for a few minutes. This increases the rate of blood flow to your heart muscle.              Speak up when you feel that you cannot exercise for even 1 more minute. At this point, the tracer is given to you through the IV, as this is considered the point of maximum stress.               If you cannot exercise by using a treadmill or bicycle, special medicines can be used to artificially increase heart rate while you are resting. This is done to put your heart under stress.               After you have received the tracer, you will be positioned on the scanning bed.              You must lie very still for up to 30 minutes. During this time, a scanning camera will be taking pictures of your heart. The images will show where blood flows through your heart muscle.  After your test  Before going home, ask when you may eat. Also, find out when to resume taking any medicines you were told to skip before the test. If you need to return for resting scans, follow any instructions. Most people can go back to their normal routine as soon as all parts of the test are finished. Drink plenty of water to help flush the tracer from your body.  Let the technologist know  Inform the technologist about the following:              What medicines you take              If you have diabetes, knee or hip problems, arthritis, asthma, or chronic lung disease              Recent chest pain since your last appointment              Inability to participate in exercise               If you have had a stroke or have vascular disease of the leg              If you are pregnant, think you might be, or are nursing  Report any symptoms  Be sure to tell the healthcare provider if you feel any of the following during the test:              Chest, arm, or jaw discomfort              Severe shortness of breath              Dizziness or lightheadedness              Feelings of panic              Inability to participate in  exercise               Palpitations              Leg cramps or pain         2. Cardiology Providers: Dr. ELIAS Hairston would like you to return for a cardiac follow up in 1 year  (June). With an Echocardiogram prior. We will contact you regarding your appointment when the time draws closer or you may call [phone] option #1 to arrange an appointment.  Mean while, if you should have any questions or concerns regarding your heart health, please contact us.  Thank you for choosing [facility] for your care.       If you have any questions regarding your visit please contact your care team:     Cardiology  Telephone Number   Jaquelin ARCHIBALD., RN  Katy CARRERA, RN   Bisi ROSENBERG, CHACE CORDOVA, CHACE MONROE, Visit Facilitator  [place] [facility] [phone] (option 1)   For scheduling appts:     [phone] (select option 1)       For the Device Clinic (Pacemakers and ICD's)  RN's :  Emilia Rainey   During business hours: [phone]    *After business hours:  [phone] (select option 4)      Normal test result notifications will be released via [facility] or mailed within 7 business days.  All other test results, will be communicated via telephone once reviewed by your cardiologist.    If you need a medication refill please contact your pharmacy.  Please allow 3 business days for your refill to be completed.    As always, thank you for trusting us with your health care needs!

## 2023-06-01 NOTE — NURSING NOTE
"Chief Complaint   Patient presents with     Abnormal Cardiac Function Study     New general cardiology for abnormal stress test.        Initial BP (!) 150/75 (BP Location: Right arm, Patient Position: Chair, Cuff Size: Adult Regular)   Pulse 64   Wt 97.1 kg (214 lb)   SpO2 97%   BMI 28.82 kg/m   Estimated body mass index is 28.82 kg/m  as calculated from the following:    Height as of 4/11/23: 1.835 m (6' 0.25\").    Weight as of this encounter: 97.1 kg (214 lb)..  BP completed using cuff size: regular    CHACE Edwards  "

## 2023-06-01 NOTE — LETTER
6/1/2023      RE: Kendrick Parr  [address]  [facility] [address]       Dear Colleague,    Thank you for the opportunity to participate in the care of your patient, Kendrick Parr, at the [facility]. Please see a copy of my visit note below.       SUBJECTIVE:  Kendrick Parr is a 78 year old male who presents for cardiac evaluation due to an abnormal stress test done recently.  Patient with risk factors of hyperlipidemia and family history of premature coronary artery disease.  Because of family history of premature coronary artery disease patient was advised to have an exercise stress test.  He currently is only 76% maximum predicted heart rate.  Test was normal at the achieved heart rate and workload.  But baseline screening echocardiogram showed moderate aortic stenosis.  Currently patient is not very active but has no cardiac symptoms.  He has no prior cardiac history.  Only current cardiac medication is Lipitor 10 mg daily.  No history of diabetes or hypertension.  Never smoked.  Patient used to be very active in the past exercising regularly at the gym as well as biking.  Currently his maximum activity is taking the dog for a walk.  Patient was an active  in the past.  Currently he will go to the [employer] job.  Patient Active Problem List    Diagnosis Date Noted     Lumbar facet arthropathy 04/10/2023     Priority: Medium     IFG (impaired fasting glucose) 12/01/2020     Priority: Medium     Overweight (BMI 25.0-29.9) 12/01/2020     Priority: Medium     BPH with obstruction/lower urinary tract symptoms 12/01/2020     Priority: Medium     ROBE (generalized anxiety disorder)      Priority: Medium     Hypercholesteremia      Priority: Medium    .  Current Outpatient Medications   Medication Sig     atorvastatin (LIPITOR) 10 MG tablet Take 1 tablet (10 mg) by mouth daily     sertraline (ZOLOFT) 50 MG tablet Take  1 tablet (50 mg) by mouth daily     tamsulosin (FLOMAX) 0.4 MG capsule TAKE ONE CAPSULE BY MOUTH ONCE DAILY     No current facility-administered medications for this visit.     Past Medical History:   Diagnosis Date     BPH with obstruction/lower urinary tract symptoms 12/1/2020     ROBE (generalized anxiety disorder)      Hypercholesteremia      IFG (impaired fasting glucose) 12/1/2020     Lumbar facet arthropathy 4/10/2023     Mild cognitive impairment 5/1/2012     Overweight (BMI 25.0-29.9) 12/1/2020     Past Surgical History:   Procedure Laterality Date     APPENDECTOMY  1960     COLONOSCOPY WITH CO2 INSUFFLATION N/A 1/4/2021    Procedure: COLONOSCOPY, WITH CO2 INSUFFLATION;  Surgeon: Livier Nguyen DO;  Location: [facility] OR     OPEN REDUCTION INTERNAL FIXATION FOREARM  2017     Allergies   Allergen Reactions     Etodolac Rash     Social History     Socioeconomic History     Marital status:      Spouse name: Not on file     Number of children: Not on file     Years of education: Not on file     Highest education level: Not on file   Occupational History     Not on file   Tobacco Use     Smoking status: Never     Smokeless tobacco: Never   Vaping Use     Vaping status: Never Used   Substance and Sexual Activity     Alcohol use: Yes     Comment: occas     Drug use: Never     Sexual activity: Not on file   Other Topics Concern     Not on file   Social History Narrative     Not on file     Social Determinants of Health     Financial Resource Strain: Not on file   Food Insecurity: Not on file   Transportation Needs: Not on file   Physical Activity: Not on file   Stress: Not on file   Social Connections: Not on file   Intimate Partner Violence: Not on file   Housing Stability: Not on file     Family History   Problem Relation Age of Onset     Alzheimer Disease Mother 86     Leukemia Father 86     Lymphoma Sister 51          REVIEW OF SYSTEMS:  General: negative, fever, chills, night sweats  Skin: negative, acne,  rash and scaling  Eyes: negative, double vision, eye pain and photophobia  Ears/Nose/Throat: negative, nasal congestion and purulent rhinorrhea  Respiratory: No dyspnea on exertion, No cough, No hemoptysis and negative  Cardiovascular: negative, palpitations, tachycardia, irregular heart beat, chest pain, exertional chest pain or pressure, paroxysmal nocturnal dyspnea, dyspnea on exertion and orthopnea       OBJECTIVE:  Blood pressure (!) 150/75, pulse 64, weight 97.1 kg (214 lb), SpO2 97 %.  General Appearance: healthy, alert, active and no distress  Head: Normocephalic. No masses, lesions, tenderness or abnormalities  Eyes: conjuctiva clear, PERRL, EOM intact  Ears: External ears normal. Canals clear. TM's normal.  Nose: Nares normal  Mouth: normal  Neck: Supple, no cervical adenopathy, no thyromegaly  Lungs: clear to auscultation  Cardiac: regular rate and rhythm, normal S1 and S2, ESM.     ASSESSMENT/PLAN:  Patient here for cardiac evaluation due to an abnormal stress test.  Patient with family history of premature coronary artery disease.  Because of this patient had an exercise stress test.  Achieved only 76% maximum predicted heart rate.  Test was normal at this workload and heart rate but echocardiogram showed moderate aortic stenosis with a peak velocity of 2.9 m/s and mean gradient of 20 mmHg.  Patient's cardiac risk factors are hyperlipidemia on treatment with statin and family history of premature coronary artery disease.  Never smoked.  No diabetes or hypertension.  Patient's EKG reviewed normal sinus rhythm right bundle branch block otherwise unremarkable.  Patient's stress echo reviewed normal biventricular function.  No significant valvular abnormality apart from aortic stenosis.  As the stress test was incomplete we will plan for a Lexiscan to complete the CAD evaluation.  Discussed aortic stenosis.  Currently with severe history of ASD is not significant enough to cause any symptoms.  He will  return to clinic in 1 year with a repeat echocardiogram to reassess aortic stenosis.  Meanwhile he is advised to continue his atorvastatin 10 mg daily.  No activity restriction is advised.  Total visit duration 45 minutes.  This included face-to-face interview, physical exam, chart review, review of EKG echocardiogram, Care Everywhere and documentation.      Please do not hesitate to contact me if you have any questions/concerns.     Sincerely,     MADAI Almendarez MD

## 2023-10-01 ENCOUNTER — TRANSFERRED RECORDS (OUTPATIENT)
Dept: MULTI SPECIALTY CLINIC | Facility: CLINIC | Age: 78
End: 2023-10-01

## 2023-10-01 LAB — RETINOPATHY: NORMAL

## 2023-10-03 DIAGNOSIS — F41.1 GAD (GENERALIZED ANXIETY DISORDER): ICD-10-CM

## 2023-10-03 DIAGNOSIS — E78.00 HYPERCHOLESTEREMIA: ICD-10-CM

## 2023-10-04 RX ORDER — ATORVASTATIN CALCIUM 10 MG/1
TABLET, FILM COATED ORAL
Qty: 90 TABLET | Refills: 0 | Status: SHIPPED | OUTPATIENT
Start: 2023-10-04 | End: 2024-02-05

## 2023-10-13 ENCOUNTER — NURSE TRIAGE (OUTPATIENT)
Dept: NURSING | Facility: CLINIC | Age: 78
End: 2023-10-13
Payer: COMMERCIAL

## 2023-10-13 NOTE — TELEPHONE ENCOUNTER
"Triage Call:    Caller: Patient  Patient has been having some shortness of breath and dry throat.  The shortness of breath is all the time after going up and down the steps or taking the dogs out for a walk.    That has been going on for approx 1.5 weeks.  Shortly before the SOB started, he was on an airplane to [place].    In the morning on expiration, he will \"hear a whistling sound coming out\" . It goes away once he is able to clear the phlem each morning.      Protocol Recommended Disposition: ED, he will be going to [place] and does have a ride.      Caller verbalized understanding of instructions and questions answered.      Rukhsana Giraldo RN on 10/13/2023 at 6:55 PM    Reason for Disposition   Long-distance travel in past month (e.g., car, bus, train, plane; with trip lasting 6 or more hours)    Additional Information   Negative: SEVERE difficulty breathing (e.g., struggling for each breath, speaks in single words)   Negative: [1] Breathing stopped AND [2] hasn't returned   Negative: Choking on something   Negative: Bluish (or gray) lips or face now   Negative: Difficult to awaken or acting confused (e.g., disoriented, slurred speech)   Negative: Wheezing started suddenly after medicine, an allergic food or bee sting   Negative: Passed out (i.e., lost consciousness, collapsed and was not responding)   Negative: Stridor (harsh sound while breathing in)   Negative: Slow, shallow and weak breathing   Negative: Sounds like a life-threatening emergency to the triager   Negative: [1] MODERATE difficulty breathing (e.g., speaks in phrases, SOB even at rest, pulse 100-120) AND [2] NEW-onset or WORSE than normal   Negative: Oxygen level (e.g., pulse oximetry) 90 percent or lower   Negative: Wheezing can be heard across the room   Negative: Drooling or spitting out saliva (because can't swallow)   Negative: History of prior \"blood clot\" in leg or lungs (i.e., deep vein thrombosis, pulmonary embolism)   Negative: " History of inherited increased risk of blood clots (e.g., Factor 5 Leiden, Anti-thrombin 3, Protein C or Protein S deficiency, Prothrombin mutation)   Negative: Major surgery in the past month   Negative: Hip or leg fracture (broken bone) in past month (or had cast on leg or ankle in past month)   Negative: Illness requiring prolonged bedrest in past month (e.g., immobilization, long hospital stay)    Protocols used: Breathing Difficulty-A-AH

## 2023-10-17 ENCOUNTER — TELEPHONE (OUTPATIENT)
Dept: FAMILY MEDICINE | Facility: CLINIC | Age: 78
End: 2023-10-17
Payer: COMMERCIAL

## 2023-10-17 NOTE — TELEPHONE ENCOUNTER
Reason for Call:  Appointment Request    Patient requesting this type of appt:  Hospital/ED Follow-Up     Requested provider: Rakesh Santoro    Reason patient unable to be scheduled: Not within requested timeframe    When does patient want to be seen/preferred time:  7 days    Comments: Nothing available unti 11/30/23, please check and advise.    Could we send this information to you in [employer] or would you prefer to receive a phone call?:   Patient would prefer a phone call   Okay to leave a detailed message?: Yes at Cell number on file:    Telephone Information:   Mobile [phone]       Call taken on 10/17/2023 at 8:39 AM by Nissa Dixon

## 2023-10-17 NOTE — TELEPHONE ENCOUNTER
Called patient and relayed provider's message below. Schedule ED follow up for 10/19 with arrival time of 2:45 pm. Patient verbalized understanding.     AMBROCIO Levy  [facility]

## 2023-10-18 PROCEDURE — 82274 ASSAY TEST FOR BLOOD FECAL: CPT | Performed by: INTERNAL MEDICINE

## 2023-10-19 ENCOUNTER — OFFICE VISIT (OUTPATIENT)
Dept: FAMILY MEDICINE | Facility: CLINIC | Age: 78
End: 2023-10-19
Payer: COMMERCIAL

## 2023-10-19 VITALS
OXYGEN SATURATION: 99 % | HEART RATE: 67 BPM | RESPIRATION RATE: 17 BRPM | HEIGHT: 72 IN | BODY MASS INDEX: 28.94 KG/M2 | DIASTOLIC BLOOD PRESSURE: 66 MMHG | TEMPERATURE: 98.7 F | SYSTOLIC BLOOD PRESSURE: 115 MMHG | WEIGHT: 213.7 LBS

## 2023-10-19 DIAGNOSIS — F41.1 GAD (GENERALIZED ANXIETY DISORDER): ICD-10-CM

## 2023-10-19 DIAGNOSIS — I48.92 ATRIAL FLUTTER, UNSPECIFIED TYPE (H): Primary | ICD-10-CM

## 2023-10-19 DIAGNOSIS — E11.9 TYPE 2 DIABETES MELLITUS WITHOUT COMPLICATION, WITHOUT LONG-TERM CURRENT USE OF INSULIN (H): ICD-10-CM

## 2023-10-19 DIAGNOSIS — I45.10 RBBB: ICD-10-CM

## 2023-10-19 DIAGNOSIS — E78.00 HYPERCHOLESTEREMIA: ICD-10-CM

## 2023-10-19 DIAGNOSIS — E61.1 IRON DEFICIENCY: ICD-10-CM

## 2023-10-19 PROBLEM — R73.01 IFG (IMPAIRED FASTING GLUCOSE): Status: RESOLVED | Noted: 2020-12-01 | Resolved: 2023-10-19

## 2023-10-19 PROCEDURE — 99214 OFFICE O/P EST MOD 30 MIN: CPT | Performed by: INTERNAL MEDICINE

## 2023-10-19 RX ORDER — METFORMIN HCL 500 MG
500 TABLET, EXTENDED RELEASE 24 HR ORAL
Qty: 90 TABLET | Refills: 1 | Status: SHIPPED | OUTPATIENT
Start: 2023-10-19 | End: 2024-04-24

## 2023-10-19 RX ORDER — METOPROLOL TARTRATE 25 MG/1
25 TABLET, FILM COATED ORAL 2 TIMES DAILY
COMMUNITY
Start: 2023-10-16 | End: 2024-05-29

## 2023-10-19 RX ORDER — FERROUS SULFATE 325(65) MG
325 TABLET ORAL EVERY OTHER DAY
Qty: 90 TABLET | Refills: 0 | Status: SHIPPED | OUTPATIENT
Start: 2023-10-19 | End: 2024-07-31

## 2023-10-19 ASSESSMENT — PAIN SCALES - GENERAL: PAINLEVEL: NO PAIN (0)

## 2023-10-19 NOTE — PROGRESS NOTES
"  Assessment & Plan     1.  Atrial flutter cardioverted to sinus rhythm on 10/16/2023.  Extensive cardiac work-up in the hospital otherwise negative.  Patient placed on a low-dose metoprolol and direct anticoagulant apixaban.  Scheduled to undergo ablation therapy in about 4 weeks.  Clinically feels okay now.  2.  Right bundle branch block not new.  3.  Type 2 diabetes mellitus of new onset.  In the hospital his A1c was 6.5%.  Prior to that he had known impaired fasting blood sugar.  Today I recommend starting metformin  mg a day  4.  Iron deficiency.  His hemoglobin is 12.1, serum iron slightly low at 25 and iron saturation low at 10%.  I do not think it is because of chronic blood loss or acute blood loss.  He had a negative colonoscopy exam on 1/4/2021.  I think is more likely related to his frequent blood donation.  He donates blood every other month.  I will have him do stool fit test and then start ferrous sulfate 325 mg daily.  We will reassess on follow-up.  5.  Hypercholesterolemia adequately treated with atorvastatin.  6.  Generalized anxiety disorder controlled with sertraline.    Asked patient to come in to see me for follow-up in early December.  We will probably recheck his iron level 1 hemoglobin at that time.     MED REC REQUIRED  Post Medication Reconciliation Status:   BMI:   Estimated body mass index is 28.95 kg/m  as calculated from the following:    Height as of this encounter: 1.83 m (6' 0.05\").    Weight as of this encounter: 96.9 kg (213 lb 11.2 oz).       Rakesh Santoro MD  [facility]    Dwight Germain is a 78 year old, presenting for the following health issues:  Hospital F/U      [facility]         Hospital Follow-up Visit:    Hospital/Nursing Home/IP Rehab Facility:  [facility]  Date of Admission: 10/14/2023  Date of Discharge: 10/16/2023  Reason(s) for Admission: Shortness of breath    Was your hospitalization related to COVID-19? No   Problems " taking medications regularly:  None  Medication changes since discharge: yes  Problems adhering to non-medication therapy:  None    Summary of hospitalization:  CareEverywhere information obtained and reviewed  Diagnostic Tests/Treatments reviewed.  Follow up needed: none  Other Healthcare Providers Involved in Patient s Care:         None  Update since discharge: improved.         Plan of care communicated with patient           78-year-old gentleman with known history of hypercholesterolemia been treated with atorvastatin and impaired fasting blood sugar at 2-day history of shortness of breath with exertional activity such as climbing stairs and eventually going to emergency room where he was discovered to have atrial flutter.    His work-up revealed normal thyroid function.  Hemoglobin is borderline low at 12.1 and iron saturation reduced to 10 and serum iron 25%.  Patient is a frequent blood donor and donates every other month.  Rest of the evaluation was negative so he underwent cardioversion on 10/16/2023.  He converted with 1 shock.  He is scheduled to undergo ablation therapy in about a month.  Currently has no chest pain or shortness of breath.  Feels good.  He thinks he had black or tarry stool couple weeks ago.  He did have a negative colonoscopy exam 1/4/2021.  No abdominal symptoms.    Review of Systems   Constitutional, HEENT, cardiovascular, pulmonary, GI, , musculoskeletal, neuro, skin, endocrine and psych systems are negative, except as otherwise noted.      Objective    There were no vitals taken for this visit.  There is no height or weight on file to calculate BMI.  Physical Exam   GENERAL: healthy, alert and no distress  NECK: no adenopathy, no asymmetry, masses, or scars and thyroid normal to palpation  RESP: lungs clear to auscultation - no rales, rhonchi or wheezes  CV: regular rate and rhythm, normal S1 S2, no S3 or S4, no murmur, click or rub, no peripheral edema and peripheral pulses  strong  ABDOMEN: soft, nontender, no hepatosplenomegaly, no masses and bowel sounds normal  MS: no gross musculoskeletal defects noted, no edema    Labs reviewed in care everywhere.  On 10/14/2023 and 10/15/2023 lab was performed results are as follows    Hemoglobin 12.1 g/dL, serum iron low at 25 and iron saturation low at 10%.  TIBC increased.  TSH normal at 1.5.  BNP normal at 43.  Cholesterol 98 HDL 42    EKG shows atrial flutter with right bundle branch block.  Right bundle branch block is not new.    Echocardiogram and even INDIO showed no evidence of thrombus.  Left ventricle ejection fraction 60%.  Mild concentric LVH noted.  No valvular disease.

## 2023-10-20 LAB — HEMOCCULT STL QL IA: NEGATIVE

## 2023-12-07 ENCOUNTER — OFFICE VISIT (OUTPATIENT)
Dept: FAMILY MEDICINE | Facility: CLINIC | Age: 78
End: 2023-12-07
Payer: COMMERCIAL

## 2023-12-07 VITALS
TEMPERATURE: 98.2 F | HEIGHT: 72 IN | OXYGEN SATURATION: 97 % | DIASTOLIC BLOOD PRESSURE: 76 MMHG | RESPIRATION RATE: 15 BRPM | BODY MASS INDEX: 27.87 KG/M2 | HEART RATE: 63 BPM | WEIGHT: 205.8 LBS | SYSTOLIC BLOOD PRESSURE: 128 MMHG

## 2023-12-07 DIAGNOSIS — E61.1 IRON DEFICIENCY: Primary | ICD-10-CM

## 2023-12-07 DIAGNOSIS — L57.0 ACTINIC KERATOSIS OF FOREHEAD: ICD-10-CM

## 2023-12-07 DIAGNOSIS — I48.92 ATRIAL FLUTTER, UNSPECIFIED TYPE (H): Chronic | ICD-10-CM

## 2023-12-07 DIAGNOSIS — E11.9 TYPE 2 DIABETES MELLITUS WITHOUT COMPLICATION, WITHOUT LONG-TERM CURRENT USE OF INSULIN (H): ICD-10-CM

## 2023-12-07 LAB
CREAT UR-MCNC: 84.8 MG/DL
HBA1C MFR BLD: 6.3 % (ref 0–5.6)
HGB BLD-MCNC: 14.6 G/DL (ref 13.3–17.7)
MICROALBUMIN UR-MCNC: <12 MG/L
MICROALBUMIN/CREAT UR: NORMAL MG/G{CREAT}

## 2023-12-07 PROCEDURE — 83036 HEMOGLOBIN GLYCOSYLATED A1C: CPT | Performed by: INTERNAL MEDICINE

## 2023-12-07 PROCEDURE — 82570 ASSAY OF URINE CREATININE: CPT | Performed by: INTERNAL MEDICINE

## 2023-12-07 PROCEDURE — 85018 HEMOGLOBIN: CPT | Performed by: INTERNAL MEDICINE

## 2023-12-07 PROCEDURE — 99214 OFFICE O/P EST MOD 30 MIN: CPT | Mod: 25 | Performed by: INTERNAL MEDICINE

## 2023-12-07 PROCEDURE — 17000 DESTRUCT PREMALG LESION: CPT | Performed by: INTERNAL MEDICINE

## 2023-12-07 PROCEDURE — 36415 COLL VENOUS BLD VENIPUNCTURE: CPT | Performed by: INTERNAL MEDICINE

## 2023-12-07 PROCEDURE — 82043 UR ALBUMIN QUANTITATIVE: CPT | Performed by: INTERNAL MEDICINE

## 2023-12-07 RX ORDER — RESPIRATORY SYNCYTIAL VIRUS VACCINE 120MCG/0.5
0.5 KIT INTRAMUSCULAR ONCE
Qty: 1 EACH | Refills: 0 | Status: CANCELLED | OUTPATIENT
Start: 2023-12-07 | End: 2023-12-07

## 2023-12-07 NOTE — PROGRESS NOTES
"  Assessment & Plan     1.  Iron deficiency for which patient is taking ferrous sulfate.  Felt to be related to frequent blood donation.  Since last clinic visit he has not donated blood and I will be checking his hemoglobin today.  Get back to with results.  2.  Actinic keratosis of the forehead.  1 lesion on the is about 4 to 5 mm on the right forehead.  Advised treating it with liquid nitrogen.  Patient agreeable.  Liquid nitrogen treatment was applied and patient tolerated procedure well.  3.  Atrial flutter episode in October.  Today seems in sinus rhythm.  Patient is to undergo ablation therapy in the future.  Has an appointment in January with his cardiologist.  4.  Type 2 diabetes mellitus currently being treated with metformin 500 mg a day.  I will be checking his A1c today and get back to with results.    Will probably ask patient to return for follow-up in April for annual physical examination.  His last 1 was on 4/11/2023.         BMI:   Estimated body mass index is 27.57 kg/m  as calculated from the following:    Height as of this encounter: 1.84 m (6' 0.44\").    Weight as of this encounter: 93.4 kg (205 lb 12.8 oz).         Rakesh Santoro MD  [facility] JOHN Germain is a 78 year old, presenting for the following health issues:  Diabetes        12/7/2023     7:02 AM   Additional Questions   Roomed by Mary   Accompanied by Self         12/7/2023     7:02 AM   Patient Reported Additional Medications   Patient reports taking the following new medications none       History of Present Illness       Diabetes:   He presents for follow up of diabetes.    He is not checking blood glucose.         He has no concerns regarding his diabetes at this time.  He is having numbness in feet and weight loss.  The patient has not had a diabetic eye exam in the last 12 months.          He eats 0-1 servings of fruits and vegetables daily.He consumes 0 sweetened beverage(s) daily.He exercises " "with enough effort to increase his heart rate 10 to 19 minutes per day.  He exercises with enough effort to increase his heart rate 7 days per week.   He is taking medications regularly.     Patient has come in for follow-up regarding diabetes and anemia.  Overall he is doing well.  He offers no new concerns.  He had stopped donating blood.  He is taking iron therapy daily.      Review of Systems   Constitutional, HEENT, cardiovascular, pulmonary, GI, , musculoskeletal, neuro, skin, endocrine and psych systems are negative, except as otherwise noted.      Objective    /76 (BP Location: Right arm, Patient Position: Sitting, Cuff Size: Adult Large)   Pulse 63   Resp 15   Ht 1.84 m (6' 0.44\")   Wt 93.4 kg (205 lb 12.8 oz)   SpO2 97%   BMI 27.57 kg/m    Body mass index is 27.57 kg/m .  Physical Exam   GENERAL: healthy, alert and no distress  RESP: lungs clear to auscultation - no rales, rhonchi or wheezes  CV: regular rate and rhythm, normal S1 S2, no S3 or S4, no murmur, click or rub, no peripheral edema and peripheral pulses strong  SKIN: There is 4 to 5 mm slightly pigmented and scaly lesion right forehead that clinically looks like actinic keratosis.  Treatment options discussed.                      "

## 2024-02-03 DIAGNOSIS — F41.1 GAD (GENERALIZED ANXIETY DISORDER): ICD-10-CM

## 2024-02-03 DIAGNOSIS — E78.00 HYPERCHOLESTEREMIA: ICD-10-CM

## 2024-02-05 RX ORDER — ATORVASTATIN CALCIUM 10 MG/1
TABLET, FILM COATED ORAL
Qty: 90 TABLET | Refills: 0 | Status: SHIPPED | OUTPATIENT
Start: 2024-02-05 | End: 2024-05-02

## 2024-04-11 ENCOUNTER — OFFICE VISIT (OUTPATIENT)
Dept: FAMILY MEDICINE | Facility: CLINIC | Age: 79
End: 2024-04-11
Attending: INTERNAL MEDICINE
Payer: COMMERCIAL

## 2024-04-11 VITALS
DIASTOLIC BLOOD PRESSURE: 74 MMHG | BODY MASS INDEX: 27.77 KG/M2 | SYSTOLIC BLOOD PRESSURE: 129 MMHG | WEIGHT: 205 LBS | OXYGEN SATURATION: 97 % | HEIGHT: 72 IN | TEMPERATURE: 97.4 F | RESPIRATION RATE: 16 BRPM | HEART RATE: 57 BPM

## 2024-04-11 DIAGNOSIS — E78.00 HYPERCHOLESTEREMIA: ICD-10-CM

## 2024-04-11 DIAGNOSIS — F41.1 GAD (GENERALIZED ANXIETY DISORDER): ICD-10-CM

## 2024-04-11 DIAGNOSIS — I45.10 RBBB: ICD-10-CM

## 2024-04-11 DIAGNOSIS — I48.92 PAROXYSMAL ATRIAL FLUTTER (H): ICD-10-CM

## 2024-04-11 DIAGNOSIS — Z01.818 PREOP GENERAL PHYSICAL EXAM: ICD-10-CM

## 2024-04-11 DIAGNOSIS — N40.1 BPH WITH OBSTRUCTION/LOWER URINARY TRACT SYMPTOMS: ICD-10-CM

## 2024-04-11 DIAGNOSIS — N13.8 BPH WITH OBSTRUCTION/LOWER URINARY TRACT SYMPTOMS: ICD-10-CM

## 2024-04-11 DIAGNOSIS — E11.9 TYPE 2 DIABETES MELLITUS WITHOUT COMPLICATION, WITHOUT LONG-TERM CURRENT USE OF INSULIN (H): Primary | ICD-10-CM

## 2024-04-11 LAB — HBA1C MFR BLD: 6.3 % (ref 0–5.6)

## 2024-04-11 PROCEDURE — 36415 COLL VENOUS BLD VENIPUNCTURE: CPT | Performed by: INTERNAL MEDICINE

## 2024-04-11 PROCEDURE — 83036 HEMOGLOBIN GLYCOSYLATED A1C: CPT | Performed by: INTERNAL MEDICINE

## 2024-04-11 PROCEDURE — 99214 OFFICE O/P EST MOD 30 MIN: CPT | Performed by: INTERNAL MEDICINE

## 2024-04-11 PROCEDURE — 80048 BASIC METABOLIC PNL TOTAL CA: CPT | Performed by: INTERNAL MEDICINE

## 2024-04-11 PROCEDURE — 80061 LIPID PANEL: CPT | Performed by: INTERNAL MEDICINE

## 2024-04-11 PROCEDURE — 82043 UR ALBUMIN QUANTITATIVE: CPT | Performed by: INTERNAL MEDICINE

## 2024-04-11 PROCEDURE — 82570 ASSAY OF URINE CREATININE: CPT | Performed by: INTERNAL MEDICINE

## 2024-04-11 RX ORDER — RESPIRATORY SYNCYTIAL VIRUS VACCINE 120MCG/0.5
0.5 KIT INTRAMUSCULAR ONCE
Qty: 1 EACH | Refills: 0 | Status: CANCELLED | OUTPATIENT
Start: 2024-04-11 | End: 2024-04-11

## 2024-04-11 SDOH — HEALTH STABILITY: PHYSICAL HEALTH: ON AVERAGE, HOW MANY DAYS PER WEEK DO YOU ENGAGE IN MODERATE TO STRENUOUS EXERCISE (LIKE A BRISK WALK)?: 7 DAYS

## 2024-04-11 ASSESSMENT — PAIN SCALES - GENERAL: PAINLEVEL: NO PAIN (0)

## 2024-04-11 ASSESSMENT — SOCIAL DETERMINANTS OF HEALTH (SDOH): HOW OFTEN DO YOU GET TOGETHER WITH FRIENDS OR RELATIVES?: ONCE A WEEK

## 2024-04-11 NOTE — PROGRESS NOTES
Preoperative Evaluation  [address] [address]  Phone: [phone]  Primary Provider: No Ref-Primary, Physician  Pre-op Performing Provider: MARIANA LEAL  Apr 11, 2024       Marcellus is a 79 year old, presenting for the following:  Diabetes (Blood thinner) and Pre-Op Exam        4/11/2024     4:37 PM   Additional Questions   Roomed by Joua   Accompanied by self         4/11/2024     4:37 PM   Patient Reported Additional Medications   Patient reports taking the following new medications No     Surgical Information  Surgery/Procedure: Cardiovascular Disease/EP FLUTTER ABLATION   Surgery Location: [facility]   Surgeon: Andrew Pan MD   Surgery Date: 4/24/2024  Time of Surgery: 7:45am  Where patient plans to recover: At home with family  Fax number for surgical facility: N/A    Assessment & Plan     1.  Preop general physical exam completed.  Patient optimized to undergo the planned surgical procedure.  2.  Paroxysmal atrial flutter patient to undergo ablation.  Currently on Eliquis and low-dose metoprolol.  3.  Right bundle branch block  4.  Type 2 diabetes mellitus.  Metformin.  Will be checking A1c today.  5.  Hypercholesterolemia being treated with atorvastatin 10 mg a day.  Lipids will be checked.  5.  Generalized anxiety disorder well-controlled with sertraline.  6.  BPH with lower urinary tract symptoms currently treated with tamsulosin.    Patient advised to hold Eliquis 72 hours prior to procedure.  Urine microalbumin, A1c BMP and lipids to be checked today I will get back to him with results.    The proposed surgical procedure is considered INTERMEDIATE risk.    Recommendation  APPROVAL GIVEN to proceed with proposed procedure, without further diagnostic evaluation.      Subjective       Via the Health Maintenance questionnaire, the patient has reported the following services have been completed -Eye Exam, this information has been  sent to the abstraction team.  HPI related to upcoming procedure:     Patient with known history of bone atrial flutter episode in October is to undergo ablation therapy.  Overall patient is doing well.  Few occasion he has felt some palpitation.  No chest pain or shortness of breath.  He has type 2 diabetes mellitus and hyperlipidemia which is adequately treated.           No data to display              Health Care Directive  Patient does not have a Health Care Directive or Living Will: Discussed advance care planning with patient; however, patient declined at this time.      Patient Active Problem List    Diagnosis Date Noted    Type 2 diabetes mellitus without complication, without long-term current use of insulin (H) 10/19/2023     Priority: Medium    Atrial flutter (H) 10/19/2023     Priority: Medium    RBBB 10/19/2023     Priority: Medium    Iron deficiency 10/15/2023     Priority: Medium    Lumbar facet arthropathy 04/10/2023     Priority: Medium    Overweight (BMI 25.0-29.9) 12/01/2020     Priority: Medium    BPH with obstruction/lower urinary tract symptoms 12/01/2020     Priority: Medium    ROBE (generalized anxiety disorder)      Priority: Medium    Hypercholesteremia      Priority: Medium      Past Medical History:   Diagnosis Date    Atrial flutter (H) 10/19/2023    BPH with obstruction/lower urinary tract symptoms 12/01/2020    ROBE (generalized anxiety disorder)     Hypercholesteremia     IFG (impaired fasting glucose) 12/01/2020    Iron deficiency 10/15/2023    Lumbar facet arthropathy 04/10/2023    Mild cognitive impairment 05/01/2012    Overweight (BMI 25.0-29.9) 12/01/2020    RBBB 10/19/2023    Type 2 diabetes mellitus without complication, without long-term current use of insulin (H) 10/19/2023    Type 2 diabetes mellitus without complication, without long-term current use of insulin (H) 10/19/2023     Past Surgical History:   Procedure Laterality Date    APPENDECTOMY  1960    COLONOSCOPY WITH CO2  "INSUFFLATION N/A 1/4/2021    Procedure: COLONOSCOPY, WITH CO2 INSUFFLATION;  Surgeon: Livier Nguyen DO;  Location: [facility] OR    OPEN REDUCTION INTERNAL FIXATION FOREARM  2017     Current Outpatient Medications   Medication Sig Dispense Refill    apixaban ANTICOAGULANT (ELIQUIS) 5 MG tablet Take 5 mg by mouth 2 times daily      atorvastatin (LIPITOR) 10 MG tablet TAKE ONE TABLET BY MOUTH ONCE DAILY 90 tablet 0    ferrous sulfate (FEROSUL) 325 (65 Fe) MG tablet Take 1 tablet (325 mg) by mouth every other day 90 tablet 0    metFORMIN (GLUCOPHAGE XR) 500 MG 24 hr tablet Take 1 tablet (500 mg) by mouth daily (with dinner) 90 tablet 1    metoprolol tartrate (LOPRESSOR) 25 MG tablet Take 25 mg by mouth 2 times daily      sertraline (ZOLOFT) 50 MG tablet TAKE ONE TABLET BY MOUTH ONCE DAILY 90 tablet 0    tamsulosin (FLOMAX) 0.4 MG capsule TAKE ONE CAPSULE BY MOUTH ONCE DAILY 90 capsule 3       Allergies   Allergen Reactions    Etodolac Rash        Social History     Tobacco Use    Smoking status: Never     Passive exposure: Never    Smokeless tobacco: Never   Substance Use Topics    Alcohol use: Yes     Comment: occas     Family History   Problem Relation Age of Onset    Alzheimer Disease Mother 86    Leukemia Father 86    Lymphoma Sister 51     History   Drug Use Unknown         Review of Systems    Review of Systems  Constitutional, HEENT, cardiovascular, pulmonary, GI, , musculoskeletal, neuro, skin, endocrine and psych systems are negative, except as otherwise noted.    Objective    /74 (BP Location: Right arm, Patient Position: Sitting, Cuff Size: Adult Regular)   Pulse 57   Temp 97.4  F (36.3  C) (Temporal)   Resp 16   Ht 1.83 m (6' 0.03\")   Wt 93 kg (205 lb)   SpO2 97%   BMI 27.78 kg/m     Estimated body mass index is 27.78 kg/m  as calculated from the following:    Height as of this encounter: 1.83 m (6' 0.03\").    Weight as of this encounter: 93 kg (205 lb).  Physical Exam  GENERAL: alert and no " distress  EYES: Eyes grossly normal to inspection, PERRL and conjunctivae and sclerae normal  HENT: ear canals and TM's normal, nose and mouth without ulcers or lesions  NECK: no adenopathy, no asymmetry, masses, or scars  RESP: lungs clear to auscultation - no rales, rhonchi or wheezes  CV: regular rates and rhythm, normal S1 S2, no S3 or S4, grade 2/6 systolic murmur heard best over the left sternal border, peripheral pulses strong, and no peripheral edema  ABDOMEN: soft, nontender, no hepatosplenomegaly, no masses and bowel sounds normal   (male): normal male genitalia without lesions or urethral discharge, no hernia  MS: no gross musculoskeletal defects noted, no edema  SKIN: no suspicious lesions or rashes  NEURO: Normal strength and tone, mentation intact and speech normal  PSYCH: mentation appears normal, affect normal/bright  LYMPH: no cervical, supraclavicular, axillary, or inguinal adenopathy    Recent Labs   Lab Test 12/07/23  0744 04/11/23  0800   HGB 14.6 14.1   PLT  --  190   NA  --  142   POTASSIUM  --  5.0   CR  --  0.83   A1C 6.3* 6.3*        Diagnostics     EKG done on 02/14/2024    Revised Cardiac Risk Index (RCRI)  The patient has the following serious cardiovascular risks for perioperative complications:   - Diabetes Mellitus (on Insulin) = 1 point     RCRI Interpretation: 1 point: Class II (low risk - 0.9% complication rate)         Signed Electronically by: Rakesh Santoro MD  Copy of this evaluation report is provided to requesting physician.

## 2024-04-12 LAB
ANION GAP SERPL CALCULATED.3IONS-SCNC: 12 MMOL/L (ref 7–15)
BUN SERPL-MCNC: 27.4 MG/DL (ref 8–23)
CALCIUM SERPL-MCNC: 9.6 MG/DL (ref 8.8–10.2)
CHLORIDE SERPL-SCNC: 103 MMOL/L (ref 98–107)
CHOLEST SERPL-MCNC: 139 MG/DL
CREAT SERPL-MCNC: 0.74 MG/DL (ref 0.67–1.17)
CREAT UR-MCNC: 132 MG/DL
DEPRECATED HCO3 PLAS-SCNC: 23 MMOL/L (ref 22–29)
EGFRCR SERPLBLD CKD-EPI 2021: >90 ML/MIN/1.73M2
FASTING STATUS PATIENT QL REPORTED: NO
GLUCOSE SERPL-MCNC: 112 MG/DL (ref 70–99)
HDLC SERPL-MCNC: 41 MG/DL
LDLC SERPL CALC-MCNC: 74 MG/DL
MICROALBUMIN UR-MCNC: <12 MG/L
MICROALBUMIN/CREAT UR: NORMAL MG/G{CREAT}
NONHDLC SERPL-MCNC: 98 MG/DL
POTASSIUM SERPL-SCNC: 4.2 MMOL/L (ref 3.4–5.3)
SODIUM SERPL-SCNC: 138 MMOL/L (ref 135–145)
TRIGL SERPL-MCNC: 122 MG/DL

## 2024-04-23 DIAGNOSIS — F41.1 GAD (GENERALIZED ANXIETY DISORDER): ICD-10-CM

## 2024-04-23 DIAGNOSIS — E11.9 TYPE 2 DIABETES MELLITUS WITHOUT COMPLICATION, WITHOUT LONG-TERM CURRENT USE OF INSULIN (H): ICD-10-CM

## 2024-04-24 RX ORDER — METFORMIN HCL 500 MG
500 TABLET, EXTENDED RELEASE 24 HR ORAL
Qty: 90 TABLET | Refills: 1 | Status: SHIPPED | OUTPATIENT
Start: 2024-04-24 | End: 2024-07-16

## 2024-05-02 ENCOUNTER — MYC MEDICAL ADVICE (OUTPATIENT)
Dept: FAMILY MEDICINE | Facility: CLINIC | Age: 79
End: 2024-05-02
Payer: COMMERCIAL

## 2024-05-02 DIAGNOSIS — E78.00 HYPERCHOLESTEREMIA: ICD-10-CM

## 2024-05-02 RX ORDER — ATORVASTATIN CALCIUM 10 MG/1
TABLET, FILM COATED ORAL
Qty: 90 TABLET | Refills: 0 | Status: SHIPPED | OUTPATIENT
Start: 2024-05-02 | End: 2024-07-31

## 2024-05-06 ENCOUNTER — MYC MEDICAL ADVICE (OUTPATIENT)
Dept: CARDIOLOGY | Facility: CLINIC | Age: 79
End: 2024-05-06
Payer: COMMERCIAL

## 2024-05-06 DIAGNOSIS — E78.5 HYPERLIPIDEMIA LDL GOAL <100: ICD-10-CM

## 2024-05-06 DIAGNOSIS — I35.0 NONRHEUMATIC AORTIC (VALVE) STENOSIS: ICD-10-CM

## 2024-05-06 DIAGNOSIS — R94.30 ABNORMAL CARDIOVASCULAR FUNCTION STUDY: ICD-10-CM

## 2024-05-06 DIAGNOSIS — R94.31 NONSPECIFIC ABNORMAL ELECTROCARDIOGRAM (ECG) (EKG): Primary | ICD-10-CM

## 2024-05-06 DIAGNOSIS — R06.09 EXERTIONAL DYSPNEA: ICD-10-CM

## 2024-05-14 DIAGNOSIS — N13.8 BPH WITH OBSTRUCTION/LOWER URINARY TRACT SYMPTOMS: ICD-10-CM

## 2024-05-14 DIAGNOSIS — N40.1 BPH WITH OBSTRUCTION/LOWER URINARY TRACT SYMPTOMS: ICD-10-CM

## 2024-05-14 RX ORDER — TAMSULOSIN HYDROCHLORIDE 0.4 MG/1
CAPSULE ORAL
Qty: 90 CAPSULE | Refills: 2 | Status: SHIPPED | OUTPATIENT
Start: 2024-05-14 | End: 2024-07-31

## 2024-05-20 ENCOUNTER — MYC MEDICAL ADVICE (OUTPATIENT)
Dept: FAMILY MEDICINE | Facility: CLINIC | Age: 79
End: 2024-05-20
Payer: COMMERCIAL

## 2024-05-21 NOTE — TELEPHONE ENCOUNTER
Inform patient BP readings are higher than normal range. Have patient setup appointment with me to discuss. Advice to follow low salt diet. May take same day spot for appointment.

## 2024-05-22 ENCOUNTER — TELEPHONE (OUTPATIENT)
Dept: FAMILY MEDICINE | Facility: CLINIC | Age: 79
End: 2024-05-22
Payer: COMMERCIAL

## 2024-05-22 NOTE — TELEPHONE ENCOUNTER
Reason for Call:  Appointment Request    Patient requesting this type of appt:  appointment for BP readings that Pt provided to Dr and was advised to aleja @ appt. In person visit only    Requested provider:  Rakesh Santoro    Reason patient unable to be scheduled: Not within requested timeframe    When does patient want to be seen/preferred time: 1-2 days    Comments: na    Could we send this information to you in [place] or would you prefer to receive a phone call?:   Patient would prefer a phone call   Okay to leave a detailed message?: Yes at Cell number on file:    Telephone Information:   Mobile [phone]       Call taken on 5/22/2024 at 7:50 AM by Tran Prado    
Appt scheduled and pt notified. Same day ok'ed per Santoro from previous [employer] messages.   
English

## 2024-05-29 ENCOUNTER — OFFICE VISIT (OUTPATIENT)
Dept: FAMILY MEDICINE | Facility: CLINIC | Age: 79
End: 2024-05-29
Payer: COMMERCIAL

## 2024-05-29 VITALS
HEART RATE: 64 BPM | RESPIRATION RATE: 16 BRPM | WEIGHT: 206.5 LBS | TEMPERATURE: 98 F | BODY MASS INDEX: 27.37 KG/M2 | SYSTOLIC BLOOD PRESSURE: 137 MMHG | HEIGHT: 73 IN | DIASTOLIC BLOOD PRESSURE: 75 MMHG

## 2024-05-29 DIAGNOSIS — E78.00 HYPERCHOLESTEREMIA: ICD-10-CM

## 2024-05-29 DIAGNOSIS — E11.9 TYPE 2 DIABETES MELLITUS WITHOUT COMPLICATION, WITHOUT LONG-TERM CURRENT USE OF INSULIN (H): ICD-10-CM

## 2024-05-29 DIAGNOSIS — Z98.890 S/P ABLATION OF ATRIAL FLUTTER: ICD-10-CM

## 2024-05-29 DIAGNOSIS — I10 ESSENTIAL HYPERTENSION: Primary | ICD-10-CM

## 2024-05-29 DIAGNOSIS — Z86.79 S/P ABLATION OF ATRIAL FLUTTER: ICD-10-CM

## 2024-05-29 DIAGNOSIS — F41.1 GAD (GENERALIZED ANXIETY DISORDER): ICD-10-CM

## 2024-05-29 DIAGNOSIS — I45.10 RBBB: ICD-10-CM

## 2024-05-29 PROCEDURE — 99214 OFFICE O/P EST MOD 30 MIN: CPT | Performed by: INTERNAL MEDICINE

## 2024-05-29 PROCEDURE — G2211 COMPLEX E/M VISIT ADD ON: HCPCS | Performed by: INTERNAL MEDICINE

## 2024-05-29 RX ORDER — LOSARTAN POTASSIUM 25 MG/1
25 TABLET ORAL DAILY
Qty: 90 TABLET | Refills: 0 | Status: SHIPPED | OUTPATIENT
Start: 2024-05-29 | End: 2024-07-31

## 2024-05-29 ASSESSMENT — PAIN SCALES - GENERAL: PAINLEVEL: NO PAIN (0)

## 2024-05-29 NOTE — PROGRESS NOTES
"  Assessment & Plan     1.  Essential hypertension.  Blood pressure at home has been running high.  Decided to start him on losartan 25 mg a day.  Advised to monitor blood pressure at home and if he remains elevated he will get back to me and we will increase the dose.  I plan to see him back for follow-up in about 6 weeks with BMP.  Side effects of the losartan including cough angioedema electrolyte disturbance discussed.  2.  Status post a total flutter abrasion 1 4/24/2024 for paroxysmal atrial flutter.  Clinically doing better.  Patient was told to remain on Eliquis for 6 months ablation.  An echocardiogram is scheduled in October.  3.  Type 2 diabetes mellitus being treated with metformin.  Under control with A1c of 6.3 on 4/11/2024  4.  Hypercholesterolemia continue with atorvastatin 10 mg a day.  Cholesterol 139 HDL 41 LDL 74 triglyceride 122 measured on 4/11/2024.  Well-controlled  5.  Generalized anxiety disorder well-controlled with sertraline  6.  Right bundle branch block.  7.  Tachycardia (atrial flutter) related heart failure in October 2023.  Resolved.  A follow-up echocardiogram scheduled for October 2024 by his cardiologist at [facility].      BMI  Estimated body mass index is 27.24 kg/m  as calculated from the following:    Height as of this encounter: 1.854 m (6' 1\").    Weight as of this encounter: 93.7 kg (206 lb 8 oz).       Dwight Germain is a 79 year old, presenting for the following health issues:  Hypertension      5/29/2024    10:10 AM   Additional Questions   Roomed by Diane Lynne Schoenherr RN     History of Present Illness       Hypertension: He presents for follow up of hypertension.  He does check blood pressure  regularly outside of the clinic. Outside blood pressures have been over 140/90. He does not follow a low salt diet.     He eats 2-3 servings of fruits and vegetables daily.He consumes 0 sweetened beverage(s) daily.He exercises with enough effort to increase his heart " "rate 20 to 29 minutes per day.  He exercises with enough effort to increase his heart rate 7 days per week.   He is taking medications regularly.     Patient had ablation for atrial flutter performed on 4/24/2024.  Since then he has done well.  He has been monitoring his blood pressure and it has been high about 50% of the time or more.  For that reason he came in.  Following ablation metoprolol was discontinued.  He offers no other concerns.    Review of Systems  Constitutional, HEENT, cardiovascular, pulmonary, GI, , musculoskeletal, neuro, skin, endocrine and psych systems are negative, except as otherwise noted.      Objective    /75 (BP Location: Right arm, Patient Position: Sitting, Cuff Size: Adult Large)   Pulse 64   Temp 98  F (36.7  C) (Oral)   Resp 16   Ht 1.854 m (6' 1\")   Wt 93.7 kg (206 lb 8 oz)   BMI 27.24 kg/m    Body mass index is 27.24 kg/m .  Physical Exam   GENERAL: alert and no distress  NECK: no adenopathy, no asymmetry, masses, or scars  RESP: lungs clear to auscultation - no rales, rhonchi or wheezes  CV: regular rates and rhythm, normal S1 S2, no S3 or S4, grade 1/6 systolic murmur heard best over the left sternal border and aortic area, peripheral pulses strong, and no peripheral edema  ABDOMEN: soft, nontender, no hepatosplenomegaly, no masses and bowel sounds normal  MS: no gross musculoskeletal defects noted, no edema  Diabetic foot exam: normal DP and PT pulses, no trophic changes or ulcerative lesions, and normal sensory exam            Signed Electronically by: Rakesh Santoro MD    "

## 2024-07-07 ENCOUNTER — HEALTH MAINTENANCE LETTER (OUTPATIENT)
Age: 79
End: 2024-07-07

## 2024-07-16 DIAGNOSIS — E11.9 TYPE 2 DIABETES MELLITUS WITHOUT COMPLICATION, WITHOUT LONG-TERM CURRENT USE OF INSULIN (H): ICD-10-CM

## 2024-07-16 RX ORDER — METFORMIN HCL 500 MG
500 TABLET, EXTENDED RELEASE 24 HR ORAL
Qty: 90 TABLET | Refills: 3 | Status: SHIPPED | OUTPATIENT
Start: 2024-07-16 | End: 2024-07-31

## 2024-07-16 NOTE — TELEPHONE ENCOUNTER
Patient has [facility] coverage and with this insurance plan, the patient is eligible to receive certain prescriptions as a 100-day supply at the 90-day supply cost.      Prescriptions to be updated to 100-day supply: metformin    New prescription needed given insufficient refills so pended for PCP review and signature.       Thank you!    Dina Mckinnon, PharmD, [facility]  Population Health Pharmacist  [phone]

## 2024-07-22 ENCOUNTER — LAB (OUTPATIENT)
Dept: LAB | Facility: CLINIC | Age: 79
End: 2024-07-22
Payer: COMMERCIAL

## 2024-07-22 DIAGNOSIS — E11.9 TYPE 2 DIABETES MELLITUS WITHOUT COMPLICATION, WITHOUT LONG-TERM CURRENT USE OF INSULIN (H): Primary | ICD-10-CM

## 2024-07-22 DIAGNOSIS — I10 ESSENTIAL HYPERTENSION: ICD-10-CM

## 2024-07-22 LAB
ANION GAP SERPL CALCULATED.3IONS-SCNC: 9 MMOL/L (ref 7–15)
BUN SERPL-MCNC: 16.7 MG/DL (ref 8–23)
CALCIUM SERPL-MCNC: 9.4 MG/DL (ref 8.8–10.4)
CHLORIDE SERPL-SCNC: 106 MMOL/L (ref 98–107)
CREAT SERPL-MCNC: 0.72 MG/DL (ref 0.67–1.17)
EGFRCR SERPLBLD CKD-EPI 2021: >90 ML/MIN/1.73M2
GLUCOSE SERPL-MCNC: 139 MG/DL (ref 70–99)
HBA1C MFR BLD: 6.1 % (ref 0–5.6)
HCO3 SERPL-SCNC: 24 MMOL/L (ref 22–29)
POTASSIUM SERPL-SCNC: 4.1 MMOL/L (ref 3.4–5.3)
SODIUM SERPL-SCNC: 139 MMOL/L (ref 135–145)

## 2024-07-22 PROCEDURE — 80048 BASIC METABOLIC PNL TOTAL CA: CPT

## 2024-07-22 PROCEDURE — 83036 HEMOGLOBIN GLYCOSYLATED A1C: CPT

## 2024-07-22 PROCEDURE — 36415 COLL VENOUS BLD VENIPUNCTURE: CPT

## 2024-07-26 SDOH — HEALTH STABILITY: PHYSICAL HEALTH: ON AVERAGE, HOW MANY DAYS PER WEEK DO YOU ENGAGE IN MODERATE TO STRENUOUS EXERCISE (LIKE A BRISK WALK)?: 7 DAYS

## 2024-07-26 SDOH — HEALTH STABILITY: PHYSICAL HEALTH: ON AVERAGE, HOW MANY MINUTES DO YOU ENGAGE IN EXERCISE AT THIS LEVEL?: 20 MIN

## 2024-07-26 ASSESSMENT — SOCIAL DETERMINANTS OF HEALTH (SDOH): HOW OFTEN DO YOU GET TOGETHER WITH FRIENDS OR RELATIVES?: ONCE A WEEK

## 2024-07-31 ENCOUNTER — OFFICE VISIT (OUTPATIENT)
Dept: FAMILY MEDICINE | Facility: CLINIC | Age: 79
End: 2024-07-31
Payer: COMMERCIAL

## 2024-07-31 VITALS
BODY MASS INDEX: 27.58 KG/M2 | RESPIRATION RATE: 16 BRPM | TEMPERATURE: 97.3 F | WEIGHT: 208.1 LBS | HEART RATE: 69 BPM | DIASTOLIC BLOOD PRESSURE: 74 MMHG | SYSTOLIC BLOOD PRESSURE: 116 MMHG | HEIGHT: 73 IN | OXYGEN SATURATION: 98 %

## 2024-07-31 DIAGNOSIS — Z98.890 S/P ABLATION OF ATRIAL FLUTTER: ICD-10-CM

## 2024-07-31 DIAGNOSIS — Z86.79 S/P ABLATION OF ATRIAL FLUTTER: ICD-10-CM

## 2024-07-31 DIAGNOSIS — E11.9 TYPE 2 DIABETES MELLITUS WITHOUT COMPLICATION, WITHOUT LONG-TERM CURRENT USE OF INSULIN (H): ICD-10-CM

## 2024-07-31 DIAGNOSIS — E66.3 OVERWEIGHT (BMI 25.0-29.9): ICD-10-CM

## 2024-07-31 DIAGNOSIS — I35.0 MODERATE AORTIC VALVE STENOSIS: ICD-10-CM

## 2024-07-31 DIAGNOSIS — F41.1 GAD (GENERALIZED ANXIETY DISORDER): ICD-10-CM

## 2024-07-31 DIAGNOSIS — I10 ESSENTIAL HYPERTENSION: ICD-10-CM

## 2024-07-31 DIAGNOSIS — Z00.00 ENCOUNTER FOR MEDICARE ANNUAL WELLNESS EXAM: Primary | ICD-10-CM

## 2024-07-31 DIAGNOSIS — N40.1 BPH WITH OBSTRUCTION/LOWER URINARY TRACT SYMPTOMS: ICD-10-CM

## 2024-07-31 DIAGNOSIS — E78.00 HYPERCHOLESTEREMIA: ICD-10-CM

## 2024-07-31 DIAGNOSIS — N13.8 BPH WITH OBSTRUCTION/LOWER URINARY TRACT SYMPTOMS: ICD-10-CM

## 2024-07-31 PROCEDURE — 99214 OFFICE O/P EST MOD 30 MIN: CPT | Mod: 25 | Performed by: INTERNAL MEDICINE

## 2024-07-31 PROCEDURE — G0439 PPPS, SUBSEQ VISIT: HCPCS | Performed by: INTERNAL MEDICINE

## 2024-07-31 RX ORDER — ATORVASTATIN CALCIUM 10 MG/1
10 TABLET, FILM COATED ORAL DAILY
Qty: 90 TABLET | Refills: 3 | Status: SHIPPED | OUTPATIENT
Start: 2024-07-31

## 2024-07-31 RX ORDER — LOSARTAN POTASSIUM 25 MG/1
25 TABLET ORAL DAILY
Qty: 90 TABLET | Refills: 3 | Status: SHIPPED | OUTPATIENT
Start: 2024-07-31

## 2024-07-31 RX ORDER — METFORMIN HCL 500 MG
500 TABLET, EXTENDED RELEASE 24 HR ORAL
Qty: 90 TABLET | Refills: 3 | Status: SHIPPED | OUTPATIENT
Start: 2024-07-31

## 2024-07-31 RX ORDER — TAMSULOSIN HYDROCHLORIDE 0.4 MG/1
0.4 CAPSULE ORAL DAILY
Qty: 90 CAPSULE | Refills: 3 | Status: SHIPPED | OUTPATIENT
Start: 2024-07-31

## 2024-07-31 ASSESSMENT — PAIN SCALES - GENERAL: PAINLEVEL: NO PAIN (0)

## 2024-07-31 NOTE — PATIENT INSTRUCTIONS
Patient Education   Preventive Care Advice   This is general advice given by our system to help you stay healthy. However, your care team may have specific advice just for you. Please talk to your care team about your preventive care needs.  Nutrition  Eat 5 or more servings of fruits and vegetables each day.  Try wheat bread, brown rice and whole grain pasta (instead of white bread, rice, and pasta).  Get enough calcium and vitamin D. Check the label on foods and aim for 100% of the RDA (recommended daily allowance).  Lifestyle  Exercise at least 150 minutes each week  (30 minutes a day, 5 days a week).  Do muscle strengthening activities 2 days a week. These help control your weight and prevent disease.  No smoking.  Wear sunscreen to prevent skin cancer.  Have a dental exam and cleaning every 6 months.  Yearly exams  See your health care team every year to talk about:  Any changes in your health.  Any medicines your care team has prescribed.  Preventive care, family planning, and ways to prevent chronic diseases.  Shots (vaccines)   HPV shots (up to age 26), if you've never had them before.  Hepatitis B shots (up to age 59), if you've never had them before.  COVID-19 shot: Get this shot when it's due.  Flu shot: Get a flu shot every year.  Tetanus shot: Get a tetanus shot every 10 years.  Pneumococcal, hepatitis A, and RSV shots: Ask your care team if you need these based on your risk.  Shingles shot (for age 50 and up)  General health tests  Diabetes screening:  Starting at age 35, Get screened for diabetes at least every 3 years.  If you are younger than age 35, ask your care team if you should be screened for diabetes.  Cholesterol test: At age 39, start having a cholesterol test every 5 years, or more often if advised.  Bone density scan (DEXA): At age 50, ask your care team if you should have this scan for osteoporosis (brittle bones).  Hepatitis C: Get tested at least once in your life.  STIs (sexually  transmitted infections)  Before age 24: Ask your care team if you should be screened for STIs.  After age 24: Get screened for STIs if you're at risk. You are at risk for STIs (including HIV) if:  You are sexually active with more than one person.  You don't use condoms every time.  You or a partner was diagnosed with a sexually transmitted infection.  If you are at risk for HIV, ask about PrEP medicine to prevent HIV.  Get tested for HIV at least once in your life, whether you are at risk for HIV or not.  Cancer screening tests  Cervical cancer screening: If you have a cervix, begin getting regular cervical cancer screening tests starting at age 21.  Breast cancer scan (mammogram): If you've ever had breasts, begin having regular mammograms starting at age 40. This is a scan to check for breast cancer.  Colon cancer screening: It is important to start screening for colon cancer at age 45.  Have a colonoscopy test every 10 years (or more often if you're at risk) Or, ask your provider about stool tests like a FIT test every year or Cologuard test every 3 years.  To learn more about your testing options, visit:   .  For help making a decision, visit:   https://bit.ly/km77083.  Prostate cancer screening test: If you have a prostate, ask your care team if a prostate cancer screening test (PSA) at age 55 is right for you.  Lung cancer screening: If you are a current or former smoker ages 50 to 80, ask your care team if ongoing lung cancer screenings are right for you.  For informational purposes only. Not to replace the advice of your health care provider. Copyright   2023 [facility]. All rights reserved. Clinically reviewed by the [facility]. [employer] [id number] - REV 01/24.  Preventing Falls: Care Instructions  Injuries and health problems such as trouble walking or poor eyesight can increase your risk of falling. So can some medicines. But there are things you can do to help  "prevent falls. You can exercise to get stronger. You can also arrange your home to make it safer.    Talk to your doctor about the medicines you take. Ask if any of them increase the risk of falls and whether they can be changed or stopped.   Try to exercise regularly. It can help improve your strength and balance. This can help lower your risk of falling.     Practice fall safety and prevention.    Wear low-heeled shoes that fit well and give your feet good support. Talk to your doctor if you have foot problems that make this hard.  Carry a cellphone or wear a medical alert device that you can use to call for help.  Use stepladders instead of chairs to reach high objects. Don't climb if you're at risk for falls. Ask for help, if needed.  Wear the correct eyeglasses, if you need them.    Make your home safer.    Remove rugs, cords, clutter, and furniture from walkways.  Keep your house well lit. Use night-lights in hallways and bathrooms.  Install and use sturdy handrails on stairways.  Wear nonskid footwear, even inside. Don't walk barefoot or in socks without shoes.    Be safe outside.    Use handrails, curb cuts, and ramps whenever possible.  Keep your hands free by using a shoulder bag or backpack.  Try to walk in well-lit areas. Watch out for uneven ground, changes in pavement, and debris.  Be careful in the winter. Walk on the grass or gravel when sidewalks are slippery. Use de-icer on steps and walkways. Add non-slip devices to shoes.    Put grab bars and nonskid mats in your shower or tub and near the toilet. Try to use a shower chair or bath bench when bathing.   Get into a tub or shower by putting in your weaker leg first. Get out with your strong side first. Have a phone or medical alert device in the bathroom with you.   Where can you learn more?  Go to https://www.[employer].net/patiented  Enter G117 in the search box to learn more about \"Preventing Falls: Care Instructions.\"  Current as of: July 17, " 2023               Content Version: 14.0    9004-4930 [employer].   Care instructions adapted under license by your healthcare professional. If you have questions about a medical condition or this instruction, always ask your healthcare professional. [employer] disclaims any warranty or liability for your use of this information.      Hearing Loss: Care Instructions  Overview     Hearing loss is a sudden or slow decrease in how well you hear. It can range from slight to profound. Permanent hearing loss can occur with aging. It also can happen when you are exposed long-term to loud noise. Examples include listening to loud music, riding motorcycles, or being around other loud machines.  Hearing loss can affect your work and home life. It can make you feel lonely or depressed. You may feel that you have lost your independence. But hearing aids and other devices can help you hear better and feel connected to others.  Follow-up care is a key part of your treatment and safety. Be sure to make and go to all appointments, and call your doctor if you are having problems. It's also a good idea to know your test results and keep a list of the medicines you take.  How can you care for yourself at home?  Avoid loud noises whenever possible. This helps keep your hearing from getting worse.  Always wear hearing protection around loud noises.  Wear a hearing aid as directed.  A professional can help you pick a hearing aid that will work best for you.  You can also get hearing aids over the counter for mild to moderate hearing loss.  Have hearing tests as your doctor suggests. They can show whether your hearing has changed. Your hearing aid may need to be adjusted.  Use other devices as needed. These may include:  Telephone amplifiers and hearing aids that can connect to a television, stereo, radio, or microphone.  Devices that use lights or vibrations. These alert you to the doorbell, a ringing  "telephone, or a baby monitor.  Television closed-captioning. This shows the words at the bottom of the screen. Most new TVs can do this.  TTY (text telephone). This lets you type messages back and forth on the telephone instead of talking or listening. These devices are also called TDD. When messages are typed on the keyboard, they are sent over the phone line to a receiving TTY. The message is shown on a monitor.  Use text messaging, social media, and email if it is hard for you to communicate by telephone.  Try to learn a listening technique called speechreading. It is not lipreading. You pay attention to people's gestures, expressions, posture, and tone of voice. These clues can help you understand what a person is saying. Face the person you are talking to, and have them face you. Make sure the lighting is good. You need to see the other person's face clearly.  Think about counseling if you need help to adjust to your hearing loss.  When should you call for help?  Watch closely for changes in your health, and be sure to contact your doctor if:    You think your hearing is getting worse.     You have new symptoms, such as dizziness or nausea.   Where can you learn more?  Go to https://www.[employer].net/patiented  Enter R798 in the search box to learn more about \"Hearing Loss: Care Instructions.\"  Current as of: September 27, 2023               Content Version: 14.0    2457-8321 [employer].   Care instructions adapted under license by your healthcare professional. If you have questions about a medical condition or this instruction, always ask your healthcare professional. [employer] disclaims any warranty or liability for your use of this information.      Bladder Training: Care Instructions  Your Care Instructions     Bladder training is used to treat urge incontinence and stress incontinence. Urge incontinence means that the need to urinate comes on so fast that you can't get to a " toilet in time. Stress incontinence means that you leak urine because of pressure on your bladder. For example, it may happen when you laugh, cough, or lift something heavy.  Bladder training can increase how long you can wait before you have to urinate. It can also help your bladder hold more urine. And it can give you better control over the urge to urinate.  It is important to remember that bladder training takes a few weeks to a few months to make a difference. You may not see results right away, but don't give up.  Follow-up care is a key part of your treatment and safety. Be sure to make and go to all appointments, and call your doctor if you are having problems. It's also a good idea to know your test results and keep a list of the medicines you take.  How can you care for yourself at home?  Work with your doctor to come up with a bladder training program that is right for you. You may use one or more of the following methods.  Delayed urination  In the beginning, try to keep from urinating for 5 minutes after you first feel the need to go.  While you wait, take deep, slow breaths to relax. Kegel exercises can also help you delay the need to go to the bathroom.  After some practice, when you can easily wait 5 minutes to urinate, try to wait 10 minutes before you urinate.  Slowly increase the waiting period until you are able to control when you have to urinate.  Scheduled urination  Empty your bladder when you first wake up in the morning.  Schedule times throughout the day when you will urinate.  Start by going to the bathroom every hour, even if you don't need to go.  Slowly increase the time between trips to the bathroom.  When you have found a schedule that works well for you, keep doing it.  If you wake up during the night and have to urinate, do it. Apply your schedule to waking hours only.  Kegel exercises  These tighten and strengthen pelvic muscles, which can help you control the flow of urine. (If  "doing these exercises causes pain, stop doing them and talk with your doctor.) To do Kegel exercises:  Squeeze your muscles as if you were trying not to pass gas. Or squeeze your muscles as if you were stopping the flow of urine. Your belly, legs, and buttocks shouldn't move.  Hold the squeeze for 3 seconds, then relax for 5 to 10 seconds.  Start with 3 seconds, then add 1 second each week until you are able to squeeze for 10 seconds.  Repeat the exercise 10 times a session. Do 3 to 8 sessions a day.  When should you call for help?  Watch closely for changes in your health, and be sure to contact your doctor if:    Your incontinence is getting worse.     You do not get better as expected.   Where can you learn more?  Go to https://www.[employer].net/patiented  Enter V684 in the search box to learn more about \"Bladder Training: Care Instructions.\"  Current as of: November 15, 2023               Content Version: 14.0    6966-6146 [employer].   Care instructions adapted under license by your healthcare professional. If you have questions about a medical condition or this instruction, always ask your healthcare professional. [employer] disclaims any warranty or liability for your use of this information.         "

## 2024-07-31 NOTE — PROGRESS NOTES
"Preventive Care Visit  [facility]  Rakesh Santoro MD, Internal Medicine  Jul 31, 2024      Assessment & Plan     1.  Encounter for Medicare annual wellness visit.  Overall exam is good.  2.  Type 2 diabetes mellitus being treated with metformin 500 mg daily with good glycemic control.  A1c 6.1 measured on 7/22/2024.  Recheck 6 months.  3.  Essential hypertension well-controlled with losartan 25 mg a day.  Continue same.  4.  Hypercholesterolemia well-controlled with atorvastatin 10 mg daily.  Cholesterol 129 HDL 41 LDL 74 triglyceride 122 measured on 4/11/2024.  Recheck in 6 months.  5.  Status post atrial flutter ablation on 4/24/2024 for paroxysmal atrial flutter.  Cardiologist recommended patient remain on Eliquis for total of 6 months post ablation.  6.  Moderate aortic valve stenosis.  Has an upcoming echocardiogram for follow-up in October.  7.  Benign prostatic hypertrophy with lower urinary tract symptoms being treated with tamsulosin.  8.  Generalized anxiety disorder well-controlled with sertraline 50 mg a day.  9.  Overweight with a BMI of about 28.    Patient will return to see me in 6 months with A1c BMP and lipids.  Overall doing well.    Patient has been advised of split billing requirements and indicates understanding: Yes        BMI  Estimated body mass index is 27.84 kg/m  as calculated from the following:    Height as of this encounter: 1.842 m (6' 0.5\").    Weight as of this encounter: 94.4 kg (208 lb 1.6 oz).   Weight management plan: Discussed healthy diet and exercise guidelines    Counseling  Appropriate preventive services were addressed with this patient via screening, questionnaire, or discussion as appropriate for fall prevention, nutrition, physical activity, Tobacco-use cessation, weight loss and cognition.  Checklist reviewing preventive services available has been given to the patient.  Reviewed patient's diet, addressing concerns and/or questions.   The " patient was provided with written information regarding signs of hearing loss.   Information on urinary incontinence and treatment options given to patient.       Dwight Germain is a 79 year old, presenting for the following:  Annual Visit, Hypertension, and Diabetes        7/31/2024    10:19 AM   Additional Questions   Roomed by Natalya   Accompanied by self         Health Care Directive  Patient does not have a Health Care Directive or Living Will: Discussed advance care planning with patient; information given to patient to review.    HPI    79 gentleman comes for annual physical examination.  Also come for follow-up of diabetes, hypertension, hypercholesterolemia and other health problems as mentioned in the problem list.  He is currently doing well.  He offers no new concerns or complaints.  No palpitation, chest pain or shortness of breath.  Takes his medication as prescribed.          7/26/2024   General Health   How would you rate your overall physical health? Good   Feel stress (tense, anxious, or unable to sleep) To some extent      (!) STRESS CONCERN      7/26/2024   Nutrition   Diet: Low salt            7/26/2024   Exercise   Days per week of moderate/strenous exercise 7 days   Average minutes spent exercising at this level 20 min            7/26/2024   Social Factors   Frequency of gathering with friends or relatives Once a week   Worry food won't last until get money to buy more No   Food not last or not have enough money for food? No   Do you have housing? (Housing is defined as stable permanent housing and does not include staying ouside in a car, in a tent, in an abandoned building, in an overnight shelter, or couch-surfing.) No   Are you worried about losing your housing? No   Lack of transportation? No   Unable to get utilities (heat,electricity)? No   Want help with housing or utility concern? No      (!) HOUSING CONCERN PRESENT      7/31/2024   Fall Risk   Gait Speed Test (Document in seconds)  5.16   Gait Speed Test Interpretation Greater than 5.01 seconds - ABNORMAL             7/26/2024   Activities of Daily Living- Home Safety   Needs help with the following daily activites None of the above   Safety concerns in the home None of the above            7/26/2024   Dental   Dentist two times every year? Yes            7/26/2024   Hearing Screening   Hearing concerns? (!) I NEED TO ASK PEOPLE TO SPEAK UP OR REPEAT THEMSELVES.    (!) IT'S HARD TO FOLLOW A CONVERSATION IN A NOISY RESTAURANT OR CROWDED ROOM.    (!) TROUBLE UNDESTANDING A SPEAKER IN A PUBLIC MEETING OR [employer] SERVICE.       Multiple values from one day are sorted in reverse-chronological order         7/26/2024   Driving Risk Screening   Patient/family members have concerns about driving No            7/26/2024   General Alertness/Fatigue Screening   Have you been more tired than usual lately? No            7/26/2024   Urinary Incontinence Screening   Bothered by leaking urine in past 6 months Yes            4/11/2024   TB Screening   Were you born outside of the [place]? No            Today's PHQ-2 Score:       7/31/2024    10:13 AM   PHQ-2 ( 1999 [employer])   Q1: Little interest or pleasure in doing things 0   Q2: Feeling down, depressed or hopeless 0   PHQ-2 Score 0   Q1: Little interest or pleasure in doing things Not at all   Q2: Feeling down, depressed or hopeless Not at all   PHQ-2 Score 0           7/26/2024   Substance Use   Alcohol more than 3/day or more than 7/wk No   Do you have a current opioid prescription? No   How severe/bad is pain from 1 to 10? 0/10 (No Pain)   Do you use any other substances recreationally? No        Social History     Tobacco Use    Smoking status: Never     Passive exposure: Never    Smokeless tobacco: Never   Vaping Use    Vaping status: Never Used   Substance Use Topics    Alcohol use: Yes     Comment: occas    Drug use: Never       ASCVD Risk   The 10-year ASCVD risk score (Maxx DUBOSE, et al., 2019)  is: 44.7%    Values used to calculate the score:      Age: 79 years      Sex: Male      Is Non- : No      Diabetic: Yes      Tobacco smoker: No      Systolic Blood Pressure: 116 mmHg      Is BP treated: Yes      HDL Cholesterol: 41 mg/dL      Total Cholesterol: 139 mg/dL    Fracture Risk Assessment Tool  Link to Frax Calculator  Use the information below to complete the Frax calculator  : 1945  Sex: male  Weight (kg): 94.4 kg (actual weight)  Height (cm): 184.2 cm  Previous Fragility Fracture:  Yes  History of parent with fractured hip:  No  Current Smoking:  No  Patient has been on glucocorticoids for more than 3 months (5mg/day or more): No  Rheumatoid Arthritis on Problem List:  No  Secondary Osteoporosis on Problem List:  No  Consumes 3 or more units of alcohol per day: No  Femoral Neck BMD (g/cm2)            Reviewed and updated as needed this visit by Provider                    Past Medical History:   Diagnosis Date    Atrial flutter (H) 10/19/2023    BPH with obstruction/lower urinary tract symptoms 2020    Essential hypertension 2024    ROBE (generalized anxiety disorder)     Hypercholesteremia     IFG (impaired fasting glucose) 2020    Iron deficiency 10/15/2023    Lumbar facet arthropathy 04/10/2023    Mild cognitive impairment 2012    Overweight (BMI 25.0-29.9) 2020    RBBB 10/19/2023    Type 2 diabetes mellitus without complication, without long-term current use of insulin (H) 10/19/2023    Type 2 diabetes mellitus without complication, without long-term current use of insulin (H) 10/19/2023     Past Surgical History:   Procedure Laterality Date    APPENDECTOMY  1960    COLONOSCOPY WITH CO2 INSUFFLATION N/A 2021    Procedure: COLONOSCOPY, WITH CO2 INSUFFLATION;  Surgeon: Livier Nguyen DO;  Location:  OR    OPEN REDUCTION INTERNAL FIXATION FOREARM  2017     BP Readings from Last 3 Encounters:   24 116/74   24 137/75    04/11/24 129/74    Wt Readings from Last 3 Encounters:   07/31/24 94.4 kg (208 lb 1.6 oz)   05/29/24 93.7 kg (206 lb 8 oz)   04/11/24 93 kg (205 lb)                  Patient Active Problem List   Diagnosis    ROBE (generalized anxiety disorder)    Hypercholesteremia    Overweight (BMI 25.0-29.9)    BPH with obstruction/lower urinary tract symptoms    Lumbar facet arthropathy    Type 2 diabetes mellitus without complication, without long-term current use of insulin (H)    Atrial flutter (H)    Iron deficiency    RBBB    S/P ablation of atrial flutter    Essential hypertension     Past Surgical History:   Procedure Laterality Date    APPENDECTOMY  1960    COLONOSCOPY WITH CO2 INSUFFLATION N/A 1/4/2021    Procedure: COLONOSCOPY, WITH CO2 INSUFFLATION;  Surgeon: Livier Nguyen DO;  Location: [facility] OR    OPEN REDUCTION INTERNAL FIXATION FOREARM  2017       Social History     Tobacco Use    Smoking status: Never     Passive exposure: Never    Smokeless tobacco: Never   Substance Use Topics    Alcohol use: Yes     Comment: occas     Family History   Problem Relation Age of Onset    Alzheimer Disease Mother 86    Leukemia Father 86    Lymphoma Sister 51         Current Outpatient Medications   Medication Sig Dispense Refill    apixaban ANTICOAGULANT (ELIQUIS) 5 MG tablet Take 5 mg by mouth 2 times daily      atorvastatin (LIPITOR) 10 MG tablet Take 1 tablet (10 mg) by mouth daily 90 tablet 3    losartan (COZAAR) 25 MG tablet Take 1 tablet (25 mg) by mouth daily 90 tablet 3    metFORMIN (GLUCOPHAGE XR) 500 MG 24 hr tablet Take 1 tablet (500 mg) by mouth daily (with dinner) 90 tablet 3    sertraline (ZOLOFT) 50 MG tablet Take 1 tablet (50 mg) by mouth daily 90 tablet 3    tamsulosin (FLOMAX) 0.4 MG capsule Take 1 capsule (0.4 mg) by mouth daily 90 capsule 3     Allergies   Allergen Reactions    Etodolac Rash     Recent Labs   Lab Test 07/22/24  0804 04/11/24  1729 12/07/23  0744 04/11/23  0800 02/14/22  0756 02/14/22  0758  12/03/20  0738   A1C 6.1* 6.3* 6.3* 6.3*   < > 6.1* 5.7*   LDL  --  74  --  65  --  91 67   HDL  --  41  --  50  --  41 45   TRIG  --  122  --  83  --  47 55   ALT  --   --   --  28  --   --   --    CR 0.72 0.74  --  0.83   < > 0.78 0.75   GFRESTIMATED >90 >90  --  >90   < > >90 >90   GFRESTBLACK  --   --   --   --   --   --  >90   POTASSIUM 4.1 4.2  --  5.0   < > 4.4 3.8   TSH  --   --   --  1.85  --   --   --     < > = values in this interval not displayed.      Current providers sharing in care for this patient include:  Patient Care Team:  Rakesh Santoro MD as PCP - General (Internal Medicine)  Rakesh Santoro MD as Assigned PCP  MADAI Almendarez MD as MD (Cardiovascular Disease)  MADAI Almendarez MD as Assigned Heart and Vascular Provider    The following health maintenance items are reviewed in Epic and correct as of today:  Health Maintenance   Topic Date Due    RSV VACCINE (Pregnancy & 60+) (1 - 1-dose 60+ series) Never done    ZOSTER IMMUNIZATION (2 of 3) 12/30/2015    EYE EXAM  10/01/2024    A1C  10/22/2024    ANNUAL REVIEW OF HM ORDERS  12/07/2024    LIPID  04/11/2025    MICROALBUMIN  04/11/2025    DIABETIC FOOT EXAM  05/29/2025    BMP  07/22/2025    MEDICARE ANNUAL WELLNESS VISIT  07/31/2025    FALL RISK ASSESSMENT  07/31/2025    DTAP/TDAP/TD IMMUNIZATION (4 - Td or Tdap) 06/15/2028    ADVANCE CARE PLANNING  04/11/2029    COLORECTAL CANCER SCREENING  01/04/2031    PHQ-2 (once per calendar year)  Completed    Pneumococcal Vaccine: 65+ Years  Completed    IPV IMMUNIZATION  Aged Out    HPV IMMUNIZATION  Aged Out    MENINGITIS IMMUNIZATION  Aged Out    RSV MONOCLONAL ANTIBODY  Aged Out    HEPATITIS C SCREENING  Discontinued    INFLUENZA VACCINE  Discontinued    COVID-19 Vaccine  Discontinued         Review of Systems  Constitutional, HEENT, cardiovascular, pulmonary, GI, , musculoskeletal, neuro, skin, endocrine and psych systems are negative, except as otherwise noted.     Objective    Exam  BP  "116/74 (BP Location: Right arm, Patient Position: Sitting, Cuff Size: Adult Large)   Pulse 69   Temp 97.3  F (36.3  C) (Temporal)   Resp 16   Ht 1.842 m (6' 0.5\")   Wt 94.4 kg (208 lb 1.6 oz)   SpO2 98%   BMI 27.84 kg/m     Estimated body mass index is 27.84 kg/m  as calculated from the following:    Height as of this encounter: 1.842 m (6' 0.5\").    Weight as of this encounter: 94.4 kg (208 lb 1.6 oz).    Physical Exam  GENERAL: alert and no distress  EYES: Eyes grossly normal to inspection, PERRL and conjunctivae and sclerae normal  HENT: ear canals and TM's normal, nose and mouth without ulcers or lesions  NECK: no adenopathy, no asymmetry, masses, or scars  RESP: lungs clear to auscultation - no rales, rhonchi or wheezes  CV: regular rate and rhythm, normal S1 S2, no S3 or S4, grade 2 systolic murmur heard at the aortic area.  Pedal pulses palpable.  No leg edema.    ABDOMEN: soft, nontender, no hepatosplenomegaly, no masses and bowel sounds normal   (male): normal male genitalia without lesions or urethral discharge, no hernia  RECTAL: normal sphincter tone, no rectal masses, prostate normal size, smooth, nontender without nodules or masses  MS: no gross musculoskeletal defects noted, no edema  SKIN: no suspicious lesions or rashes  NEURO: Normal strength and tone, mentation intact and speech normal  PSYCH: mentation appears normal, affect normal/bright  LYMPH: no cervical, supraclavicular, axillary, or inguinal adenopathy  Diabetic foot exam: normal DP and PT pulses, no trophic changes or ulcerative lesions, and normal sensory exam         7/31/2024   Mini Cog   Clock Draw Score 2 Normal   3 Item Recall 3 objects recalled   Mini Cog Total Score 5                 Signed Electronically by: Rakesh Santoro MD    "

## 2024-08-15 ENCOUNTER — DOCUMENTATION ONLY (OUTPATIENT)
Dept: OTHER | Facility: CLINIC | Age: 79
End: 2024-08-15
Payer: COMMERCIAL

## 2024-10-06 ENCOUNTER — TRANSFERRED RECORDS (OUTPATIENT)
Dept: MULTI SPECIALTY CLINIC | Facility: CLINIC | Age: 79
End: 2024-10-06

## 2024-10-06 LAB — RETINOPATHY: NORMAL

## 2024-10-07 ENCOUNTER — ANCILLARY PROCEDURE (OUTPATIENT)
Dept: CARDIOLOGY | Facility: CLINIC | Age: 79
End: 2024-10-07
Attending: INTERNAL MEDICINE
Payer: COMMERCIAL

## 2024-10-07 DIAGNOSIS — I35.0 NONRHEUMATIC AORTIC (VALVE) STENOSIS: ICD-10-CM

## 2024-10-07 DIAGNOSIS — R94.31 NONSPECIFIC ABNORMAL ELECTROCARDIOGRAM (ECG) (EKG): ICD-10-CM

## 2024-10-07 DIAGNOSIS — R06.09 EXERTIONAL DYSPNEA: ICD-10-CM

## 2024-10-07 DIAGNOSIS — R94.30 ABNORMAL CARDIOVASCULAR FUNCTION STUDY: ICD-10-CM

## 2024-10-07 DIAGNOSIS — E78.5 HYPERLIPIDEMIA LDL GOAL <100: ICD-10-CM

## 2024-10-07 LAB — LVEF ECHO: NORMAL

## 2024-10-07 PROCEDURE — 93306 TTE W/DOPPLER COMPLETE: CPT | Performed by: INTERNAL MEDICINE

## 2024-10-17 ENCOUNTER — OFFICE VISIT (OUTPATIENT)
Dept: CARDIOLOGY | Facility: CLINIC | Age: 79
End: 2024-10-17
Payer: COMMERCIAL

## 2024-10-17 VITALS
BODY MASS INDEX: 28.36 KG/M2 | WEIGHT: 212 LBS | DIASTOLIC BLOOD PRESSURE: 79 MMHG | OXYGEN SATURATION: 96 % | HEART RATE: 69 BPM | SYSTOLIC BLOOD PRESSURE: 146 MMHG

## 2024-10-17 DIAGNOSIS — I35.9 NONRHEUMATIC AORTIC VALVE DISORDER: ICD-10-CM

## 2024-10-17 DIAGNOSIS — Z98.890 STATUS POST ABLATION OF ATRIAL FLUTTER: Primary | ICD-10-CM

## 2024-10-17 DIAGNOSIS — Z86.79 STATUS POST ABLATION OF ATRIAL FLUTTER: Primary | ICD-10-CM

## 2024-10-17 PROCEDURE — 99214 OFFICE O/P EST MOD 30 MIN: CPT | Performed by: INTERNAL MEDICINE

## 2024-10-17 NOTE — LETTER
10/17/2024      RE: Kendrick Parr  [address]  [place] [address]       Dear Colleague,    Thank you for the opportunity to participate in the care of your patient, Kendrick Parr, at the [facility]. Please see a copy of my visit note below.       SUBJECTIVE:  Kendrick Parr is a 79 year old male who presents for follow-up.  Aortic stenosis, status post atrial flutter ablation in May 2024.  Last year patient was evaluated for family history of premature coronary artery disease.  Patient was advised to have an exercise stress echo.  Reached 76% maximum predicted heart rate and was normal test.  But baseline echocardiogram showed moderate aortic stenosis as per report.  This visit was to reassess aortic stenosis.  In October of last year patient presented to [facility] with heart failure symptoms.  Patient was found to be in atrial flutter.  Subsequently patient had flutter ablation in May of this year.  Currently patient is asymptomatic.  He had no cardiac complaints.  Denied exertional chest pain shortness of breath or heart failure symptoms or arrhythmia symptoms.    Patient Active Problem List    Diagnosis Date Noted     Moderate aortic valve stenosis 07/31/2024     Priority: Medium     Essential hypertension 05/29/2024     Priority: Medium     S/P ablation of atrial flutter 04/24/2024     Priority: Medium     At [facility]       Type 2 diabetes mellitus without complication, without long-term current use of insulin (H) 10/19/2023     Priority: Medium     Atrial flutter (H) 10/19/2023     Priority: Medium     RBBB 10/19/2023     Priority: Medium     Iron deficiency 10/15/2023     Priority: Medium     Lumbar facet arthropathy 04/10/2023     Priority: Medium     Overweight (BMI 25.0-29.9) 12/01/2020     Priority: Medium     BPH with obstruction/lower urinary tract symptoms 12/01/2020     Priority: Medium      ROBE (generalized anxiety disorder)      Priority: Medium     Hypercholesteremia      Priority: Medium    .  Current Outpatient Medications   Medication Sig Dispense Refill     apixaban ANTICOAGULANT (ELIQUIS) 5 MG tablet Take 5 mg by mouth 2 times daily       atorvastatin (LIPITOR) 10 MG tablet Take 1 tablet (10 mg) by mouth daily 90 tablet 3     losartan (COZAAR) 25 MG tablet Take 1 tablet (25 mg) by mouth daily 90 tablet 3     metFORMIN (GLUCOPHAGE XR) 500 MG 24 hr tablet Take 1 tablet (500 mg) by mouth daily (with dinner) 90 tablet 3     sertraline (ZOLOFT) 50 MG tablet Take 1 tablet (50 mg) by mouth daily 90 tablet 3     tamsulosin (FLOMAX) 0.4 MG capsule Take 1 capsule (0.4 mg) by mouth daily 90 capsule 3     No current facility-administered medications for this visit.     Past Medical History:   Diagnosis Date     Atrial flutter (H) 10/19/2023     BPH with obstruction/lower urinary tract symptoms 12/01/2020     Essential hypertension 05/29/2024     ROBE (generalized anxiety disorder)      Hypercholesteremia      IFG (impaired fasting glucose) 12/01/2020     Iron deficiency 10/15/2023     Lumbar facet arthropathy 04/10/2023     Mild cognitive impairment 05/01/2012     Moderate aortic valve stenosis 07/31/2024     Overweight (BMI 25.0-29.9) 12/01/2020     RBBB 10/19/2023     Type 2 diabetes mellitus without complication, without long-term current use of insulin (H) 10/19/2023     Type 2 diabetes mellitus without complication, without long-term current use of insulin (H) 10/19/2023     Past Surgical History:   Procedure Laterality Date     APPENDECTOMY  1960     COLONOSCOPY WITH CO2 INSUFFLATION N/A 1/4/2021    Procedure: COLONOSCOPY, WITH CO2 INSUFFLATION;  Surgeon: Livier Nguyen DO;  Location: [facility] OR     OPEN REDUCTION INTERNAL FIXATION FOREARM  2017     Allergies   Allergen Reactions     Etodolac Rash     Social History     Socioeconomic History     Marital status:      Spouse name: Not on file      Number of children: Not on file     Years of education: Not on file     Highest education level: Not on file   Occupational History     Not on file   Tobacco Use     Smoking status: Never     Passive exposure: Never     Smokeless tobacco: Never   Vaping Use     Vaping status: Never Used   Substance and Sexual Activity     Alcohol use: Yes     Comment: occas     Drug use: Never     Sexual activity: Not on file   Other Topics Concern     Not on file   Social History Narrative     Not on file     Social Determinants of Health     Financial Resource Strain: Low Risk  (7/26/2024)    Financial Resource Strain      Within the past 12 months, have you or your family members you live with been unable to get utilities (heat, electricity) when it was really needed?: No   Food Insecurity: Low Risk  (7/26/2024)    Food Insecurity      Within the past 12 months, did you worry that your food would run out before you got money to buy more?: No      Within the past 12 months, did the food you bought just not last and you didn t have money to get more?: No   Transportation Needs: Low Risk  (7/26/2024)    Transportation Needs      Within the past 12 months, has lack of transportation kept you from medical appointments, getting your medicines, non-medical meetings or appointments, work, or from getting things that you need?: No   Physical Activity: Insufficiently Active (7/26/2024)    Exercise Vital Sign      Days of Exercise per Week: 7 days      Minutes of Exercise per Session: 20 min   Stress: Stress Concern Present (7/26/2024)    [employer] - Occupational Stress Questionnaire      Feeling of Stress : To some extent   Social Connections: Unknown (7/26/2024)    Social Connection and Isolation Panel [NHANES]      Frequency of Communication with Friends and Family: Not on file      Frequency of Social Gatherings with Friends and Family: Once a week      Attends Mandaen Services: Not on file      Active  Member of Clubs or Organizations: Not on file      Attends Club or Organization Meetings: Not on file      Marital Status: Not on file   Interpersonal Safety: Low Risk  (7/31/2024)    Interpersonal Safety      Do you feel physically and emotionally safe where you currently live?: Yes      Within the past 12 months, have you been hit, slapped, kicked or otherwise physically hurt by someone?: No      Within the past 12 months, have you been humiliated or emotionally abused in other ways by your partner or ex-partner?: No   Housing Stability: High Risk (7/26/2024)    Housing Stability      Do you have housing? : No      Are you worried about losing your housing?: No     Family History   Problem Relation Age of Onset     Alzheimer Disease Mother 86     Leukemia Father 86     Lymphoma Sister 51          REVIEW OF SYSTEMS:  General: negative, fever, chills, night sweats  Skin: negative, acne, rash, and scaling  Eyes: negative, double vision, eye pain, and photophobia  Ears/Nose/Throat: negative, nasal congestion, and purulent rhinorrhea  Respiratory: No dyspnea on exertion, No cough, No hemoptysis, and negative  Cardiovascular: negative, palpitations, tachycardia, irregular heart beat, chest pain, exertional chest pain or pressure, paroxysmal nocturnal dyspnea, dyspnea on exertion, and orthopnea         OBJECTIVE:  Blood pressure (!) 146/79, pulse 69, weight 96.2 kg (212 lb), SpO2 96%.  General Appearance: healthy, alert, active, and no distress  Head: Normocephalic. No masses, lesions, tenderness or abnormalities  Eyes: conjuctiva clear, PERRL, EOM intact  Ears: External ears normal. Canals clear. TM's normal.  Nose: Nares normal  Mouth: normal  Neck: Supple, no cervical adenopathy, no thyromegaly  Lungs: clear to auscultation  Cardiac: regular rate and rhythm, normal S1 and S2, ESM.     ASSESSMENT/PLAN:  Patient here for follow-up.  Aortic stenosis.  Also patient had a bout of atrial flutter in October of last year.   Patient had initial cardioversion and subsequently flutter ablation in May 2024.  He was on a 6-month course of anticoagulation.  Today patient had no cardiac complaints overall he is doing very well from cardiac standpoint.  As patient completed 6 months of anticoagulation advised patient to discontinue the Eliquis.  Recent echocardiogram reviewed and result discussed with patient.  Mild aortic stenosis.  Doppler evaluation results are shown below.  Ao V2 max: 239.0 cm/sec  Ao max P.8 mmHg  Ao V2 mean: 175.0 cm/sec  Ao mean P.0 mmHg  Ao V2 VTI: 55.8 cm  FATIMAH(I,D): 1.7 cm2  FATIMAH(V,D): 1.8 cm2   Echo result discussed with patient.  Mild aortic stenosis.  Symptoms of severe aortic stenosis discussed.  As his aortic stenosis is mild will reassess in 2 years time with a repeat echocardiogram.  Meanwhile patient is advised to continue his current medications.  This include losartan 25 mg daily and Lipitor 10 mg daily.  He will discontinue his Eliquis.  When patient presented with atrial flutter in 2023 his heart rate was in the 90s.  Total visit duration 30 minutes.  This included face-to-face interview, physical exam, chart review, review of records from [facility] including EKG EP ablation recent echocardiogram and documentation.      Please do not hesitate to contact me if you have any questions/concerns.     Sincerely,     MADAI Almendarez MD

## 2024-10-17 NOTE — PATIENT INSTRUCTIONS
Thank you for coming to the [facility] [place]; please note the following instructions:    1. Dr Hairston follow up in two year with an ECHO for aortic stenosis        If you have any questions regarding your visit please contact your care team:     Cardiology  Telephone Number   Jaquelin ARCHIBALD., RN  Kathy DASH, RN  Marbella ALCALA, RN  Bisi ROSENBERG, RMA  Judy CORDOVA, RMA  Dina MONROE, Visit Facilitator  [facility] [phone] (option 1)   For scheduling appts:     [phone] (select option 1)       For the Device Clinic (Pacemakers and ICD's)  RN's :  Emilia Rainey   During business hours: [phone]    *After business hours:  [phone] (select option 4)      Normal test result notifications will be released via Zevia or mailed within 7 business days.  All other test results, will be communicated via telephone once reviewed by your cardiologist.    If you need a medication refill please contact your pharmacy.  Please allow 3 business days for your refill to be completed.    As always, thank you for trusting us with your health care needs!

## 2024-10-17 NOTE — PROGRESS NOTES
SUBJECTIVE:  Kendrick Parr is a 79 year old male who presents for follow-up.  Aortic stenosis, status post atrial flutter ablation in May 2024.  Last year patient was evaluated for family history of premature coronary artery disease.  Patient was advised to have an exercise stress echo.  Reached 76% maximum predicted heart rate and was normal test.  But baseline echocardiogram showed moderate aortic stenosis as per report.  This visit was to reassess aortic stenosis.  In October of last year patient presented to [facility] with heart failure symptoms.  Patient was found to be in atrial flutter.  Subsequently patient had flutter ablation in May of this year.  Currently patient is asymptomatic.  He had no cardiac complaints.  Denied exertional chest pain shortness of breath or heart failure symptoms or arrhythmia symptoms.    Patient Active Problem List    Diagnosis Date Noted    Moderate aortic valve stenosis 07/31/2024     Priority: Medium    Essential hypertension 05/29/2024     Priority: Medium    S/P ablation of atrial flutter 04/24/2024     Priority: Medium     At [facility]      Type 2 diabetes mellitus without complication, without long-term current use of insulin (H) 10/19/2023     Priority: Medium    Atrial flutter (H) 10/19/2023     Priority: Medium    RBBB 10/19/2023     Priority: Medium    Iron deficiency 10/15/2023     Priority: Medium    Lumbar facet arthropathy 04/10/2023     Priority: Medium    Overweight (BMI 25.0-29.9) 12/01/2020     Priority: Medium    BPH with obstruction/lower urinary tract symptoms 12/01/2020     Priority: Medium    ROBE (generalized anxiety disorder)      Priority: Medium    Hypercholesteremia      Priority: Medium    .  Current Outpatient Medications   Medication Sig Dispense Refill    apixaban ANTICOAGULANT (ELIQUIS) 5 MG tablet Take 5 mg by mouth 2 times daily      atorvastatin (LIPITOR) 10 MG tablet Take 1 tablet (10 mg) by mouth daily 90 tablet 3    losartan  (COZAAR) 25 MG tablet Take 1 tablet (25 mg) by mouth daily 90 tablet 3    metFORMIN (GLUCOPHAGE XR) 500 MG 24 hr tablet Take 1 tablet (500 mg) by mouth daily (with dinner) 90 tablet 3    sertraline (ZOLOFT) 50 MG tablet Take 1 tablet (50 mg) by mouth daily 90 tablet 3    tamsulosin (FLOMAX) 0.4 MG capsule Take 1 capsule (0.4 mg) by mouth daily 90 capsule 3     No current facility-administered medications for this visit.     Past Medical History:   Diagnosis Date    Atrial flutter (H) 10/19/2023    BPH with obstruction/lower urinary tract symptoms 12/01/2020    Essential hypertension 05/29/2024    ROBE (generalized anxiety disorder)     Hypercholesteremia     IFG (impaired fasting glucose) 12/01/2020    Iron deficiency 10/15/2023    Lumbar facet arthropathy 04/10/2023    Mild cognitive impairment 05/01/2012    Moderate aortic valve stenosis 07/31/2024    Overweight (BMI 25.0-29.9) 12/01/2020    RBBB 10/19/2023    Type 2 diabetes mellitus without complication, without long-term current use of insulin (H) 10/19/2023    Type 2 diabetes mellitus without complication, without long-term current use of insulin (H) 10/19/2023     Past Surgical History:   Procedure Laterality Date    APPENDECTOMY  1960    COLONOSCOPY WITH CO2 INSUFFLATION N/A 1/4/2021    Procedure: COLONOSCOPY, WITH CO2 INSUFFLATION;  Surgeon: Livier Nguyen DO;  Location: [facility] OR    OPEN REDUCTION INTERNAL FIXATION FOREARM  2017     Allergies   Allergen Reactions    Etodolac Rash     Social History     Socioeconomic History    Marital status:      Spouse name: Not on file    Number of children: Not on file    Years of education: Not on file    Highest education level: Not on file   Occupational History    Not on file   Tobacco Use    Smoking status: Never     Passive exposure: Never    Smokeless tobacco: Never   Vaping Use    Vaping status: Never Used   Substance and Sexual Activity    Alcohol use: Yes     Comment: occas    Drug use: Never    Sexual  activity: Not on file   Other Topics Concern    Not on file   Social History Narrative    Not on file     Social Determinants of Health     Financial Resource Strain: Low Risk  (7/26/2024)    Financial Resource Strain     Within the past 12 months, have you or your family members you live with been unable to get utilities (heat, electricity) when it was really needed?: No   Food Insecurity: Low Risk  (7/26/2024)    Food Insecurity     Within the past 12 months, did you worry that your food would run out before you got money to buy more?: No     Within the past 12 months, did the food you bought just not last and you didn t have money to get more?: No   Transportation Needs: Low Risk  (7/26/2024)    Transportation Needs     Within the past 12 months, has lack of transportation kept you from medical appointments, getting your medicines, non-medical meetings or appointments, work, or from getting things that you need?: No   Physical Activity: Insufficiently Active (7/26/2024)    Exercise Vital Sign     Days of Exercise per Week: 7 days     Minutes of Exercise per Session: 20 min   Stress: Stress Concern Present (7/26/2024)    [employer] [place] [employer] - Occupational Stress Questionnaire     Feeling of Stress : To some extent   Social Connections: Unknown (7/26/2024)    Social Connection and Isolation Panel [NHANES]     Frequency of Communication with Friends and Family: Not on file     Frequency of Social Gatherings with Friends and Family: Once a week     Attends [employer] Services: Not on file     Active Member of Clubs or Organizations: Not on file     Attends Club or Organization Meetings: Not on file     Marital Status: Not on file   Interpersonal Safety: Low Risk  (7/31/2024)    Interpersonal Safety     Do you feel physically and emotionally safe where you currently live?: Yes     Within the past 12 months, have you been hit, slapped, kicked or otherwise physically hurt by someone?: No     Within  the past 12 months, have you been humiliated or emotionally abused in other ways by your partner or ex-partner?: No   Housing Stability: High Risk (7/26/2024)    Housing Stability     Do you have housing? : No     Are you worried about losing your housing?: No     Family History   Problem Relation Age of Onset    Alzheimer Disease Mother 86    Leukemia Father 86    Lymphoma Sister 51          REVIEW OF SYSTEMS:  General: negative, fever, chills, night sweats  Skin: negative, acne, rash, and scaling  Eyes: negative, double vision, eye pain, and photophobia  Ears/Nose/Throat: negative, nasal congestion, and purulent rhinorrhea  Respiratory: No dyspnea on exertion, No cough, No hemoptysis, and negative  Cardiovascular: negative, palpitations, tachycardia, irregular heart beat, chest pain, exertional chest pain or pressure, paroxysmal nocturnal dyspnea, dyspnea on exertion, and orthopnea         OBJECTIVE:  Blood pressure (!) 146/79, pulse 69, weight 96.2 kg (212 lb), SpO2 96%.  General Appearance: healthy, alert, active, and no distress  Head: Normocephalic. No masses, lesions, tenderness or abnormalities  Eyes: conjuctiva clear, PERRL, EOM intact  Ears: External ears normal. Canals clear. TM's normal.  Nose: Nares normal  Mouth: normal  Neck: Supple, no cervical adenopathy, no thyromegaly  Lungs: clear to auscultation  Cardiac: regular rate and rhythm, normal S1 and S2, ESM.     ASSESSMENT/PLAN:  Patient here for follow-up.  Aortic stenosis.  Also patient had a bout of atrial flutter in October of last year.  Patient had initial cardioversion and subsequently flutter ablation in May 2024.  He was on a 6-month course of anticoagulation.  Today patient had no cardiac complaints overall he is doing very well from cardiac standpoint.  As patient completed 6 months of anticoagulation advised patient to discontinue the Eliquis.  Recent echocardiogram reviewed and result discussed with patient.  Mild aortic stenosis.   Doppler evaluation results are shown below.  Ao V2 max: 239.0 cm/sec  Ao max P.8 mmHg  Ao V2 mean: 175.0 cm/sec  Ao mean P.0 mmHg  Ao V2 VTI: 55.8 cm  FATIMAH(I,D): 1.7 cm2  FATIMAH(V,D): 1.8 cm2   Echo result discussed with patient.  Mild aortic stenosis.  Symptoms of severe aortic stenosis discussed.  As his aortic stenosis is mild will reassess in 2 years time with a repeat echocardiogram.  Meanwhile patient is advised to continue his current medications.  This include losartan 25 mg daily and Lipitor 10 mg daily.  He will discontinue his Eliquis.  When patient presented with atrial flutter in 2023 his heart rate was in the 90s.  Total visit duration 30 minutes.  This included face-to-face interview, physical exam, chart review, review of records from [facility] including EKG EP ablation recent echocardiogram and documentation.

## 2024-10-17 NOTE — NURSING NOTE
"Chief Complaint   Patient presents with    RECHECK      Return general cardiology for overdue annual follow-up       Initial BP (!) 146/79 (BP Location: Left arm, Patient Position: Chair, Cuff Size: Adult Regular)   Pulse 69   Wt 96.2 kg (212 lb)   SpO2 96%   BMI 28.36 kg/m   Estimated body mass index is 28.36 kg/m  as calculated from the following:    Height as of 7/31/24: 1.842 m (6' 0.5\").    Weight as of this encounter: 96.2 kg (212 lb)..  BP completed using cuff size: regular    CHACE Edwards    "

## 2024-11-24 ENCOUNTER — HEALTH MAINTENANCE LETTER (OUTPATIENT)
Age: 79
End: 2024-11-24

## 2025-01-28 ENCOUNTER — LAB (OUTPATIENT)
Dept: LAB | Facility: CLINIC | Age: 80
End: 2025-01-28
Payer: COMMERCIAL

## 2025-01-28 DIAGNOSIS — I10 ESSENTIAL HYPERTENSION: ICD-10-CM

## 2025-01-28 DIAGNOSIS — E11.9 TYPE 2 DIABETES MELLITUS WITHOUT COMPLICATION, WITHOUT LONG-TERM CURRENT USE OF INSULIN (H): ICD-10-CM

## 2025-01-28 DIAGNOSIS — E78.00 HYPERCHOLESTEREMIA: ICD-10-CM

## 2025-01-28 LAB
ANION GAP SERPL CALCULATED.3IONS-SCNC: 8 MMOL/L (ref 7–15)
BUN SERPL-MCNC: 21.3 MG/DL (ref 8–23)
CALCIUM SERPL-MCNC: 9.9 MG/DL (ref 8.8–10.4)
CHLORIDE SERPL-SCNC: 104 MMOL/L (ref 98–107)
CHOLEST SERPL-MCNC: 131 MG/DL
CREAT SERPL-MCNC: 0.78 MG/DL (ref 0.67–1.17)
EGFRCR SERPLBLD CKD-EPI 2021: >90 ML/MIN/1.73M2
EST. AVERAGE GLUCOSE BLD GHB EST-MCNC: 137 MG/DL
FASTING STATUS PATIENT QL REPORTED: YES
FASTING STATUS PATIENT QL REPORTED: YES
GLUCOSE SERPL-MCNC: 146 MG/DL (ref 70–99)
HBA1C MFR BLD: 6.4 % (ref 0–5.6)
HCO3 SERPL-SCNC: 27 MMOL/L (ref 22–29)
HDLC SERPL-MCNC: 40 MG/DL
LDLC SERPL CALC-MCNC: 75 MG/DL
NONHDLC SERPL-MCNC: 91 MG/DL
POTASSIUM SERPL-SCNC: 4.4 MMOL/L (ref 3.4–5.3)
SODIUM SERPL-SCNC: 139 MMOL/L (ref 135–145)
TRIGL SERPL-MCNC: 79 MG/DL

## 2025-01-28 PROCEDURE — 83036 HEMOGLOBIN GLYCOSYLATED A1C: CPT

## 2025-01-28 PROCEDURE — 80048 BASIC METABOLIC PNL TOTAL CA: CPT

## 2025-01-28 PROCEDURE — 36415 COLL VENOUS BLD VENIPUNCTURE: CPT

## 2025-01-28 PROCEDURE — 80061 LIPID PANEL: CPT

## 2025-01-30 ENCOUNTER — OFFICE VISIT (OUTPATIENT)
Dept: FAMILY MEDICINE | Facility: CLINIC | Age: 80
End: 2025-01-30
Payer: COMMERCIAL

## 2025-01-30 VITALS
HEART RATE: 69 BPM | BODY MASS INDEX: 29.69 KG/M2 | WEIGHT: 219.2 LBS | SYSTOLIC BLOOD PRESSURE: 135 MMHG | OXYGEN SATURATION: 97 % | HEIGHT: 72 IN | RESPIRATION RATE: 16 BRPM | DIASTOLIC BLOOD PRESSURE: 77 MMHG | TEMPERATURE: 98.2 F

## 2025-01-30 DIAGNOSIS — Z86.79 S/P ABLATION OF ATRIAL FLUTTER: ICD-10-CM

## 2025-01-30 DIAGNOSIS — E66.3 OVERWEIGHT (BMI 25.0-29.9): ICD-10-CM

## 2025-01-30 DIAGNOSIS — Z98.890 S/P ABLATION OF ATRIAL FLUTTER: ICD-10-CM

## 2025-01-30 DIAGNOSIS — I48.92 ATRIAL FLUTTER, UNSPECIFIED TYPE (H): ICD-10-CM

## 2025-01-30 DIAGNOSIS — F41.1 GAD (GENERALIZED ANXIETY DISORDER): ICD-10-CM

## 2025-01-30 DIAGNOSIS — E11.9 TYPE 2 DIABETES MELLITUS WITHOUT COMPLICATION, WITHOUT LONG-TERM CURRENT USE OF INSULIN (H): ICD-10-CM

## 2025-01-30 DIAGNOSIS — I35.0 MILD AORTIC VALVE STENOSIS: ICD-10-CM

## 2025-01-30 DIAGNOSIS — N40.1 BPH WITH OBSTRUCTION/LOWER URINARY TRACT SYMPTOMS: ICD-10-CM

## 2025-01-30 DIAGNOSIS — I10 ESSENTIAL HYPERTENSION: Primary | ICD-10-CM

## 2025-01-30 DIAGNOSIS — E78.00 HYPERCHOLESTEREMIA: ICD-10-CM

## 2025-01-30 DIAGNOSIS — N13.8 BPH WITH OBSTRUCTION/LOWER URINARY TRACT SYMPTOMS: ICD-10-CM

## 2025-01-30 NOTE — PROGRESS NOTES
Assessment & Plan     1.  Essential hypertension under control with losartan 25 mg a day.  2.  Type 2 diabetes mellitus with A1c having gone up slightly to now 6.4.  Continue with metformin XR 5 mg a day and reassess in 6 months.  He has gained 11 pounds of weight so discussed weight reduction and exercise.  3.  Overweight BMI close to 30.  Weight up 11 pounds in 6 months.  Weight reduction discussed.  4.  Hypercholesterolemia adequately treated with atorvastatin 10 mg a day with cholesterol 131 HDL 40 LDL 75 triglycerides 79.  Continue same.  5.  Atrial flutter in the past status post ablation and now remaining in sinus rhythm.  Patient is off anticoagulants.  6.  BPH with lower urinary tract obstruction symptoms well-controlled with tamsulosin 0.4 mg a day.  7.  Generalized anxiety disorder controlled with sertraline 50 mg a day.  8.  Mild aortic valve stenosis proven by on 10/7/2024.    Patient will return for follow-up with CBC CMP A1c urine microalbumin lipids for a physical examination in July.      BMI  Estimated body mass index is 29.73 kg/m  as calculated from the following:    Height as of this encounter: 1.829 m (6').    Weight as of this encounter: 99.4 kg (219 lb 3.2 oz).   Weight management plan: Discussed healthy diet and exercise guidelines      Dwight Germain is a 79 year old, presenting for the following health issues:  Diabetes      1/30/2025     8:27 AM   Additional Questions   Roomed by Carito CRAWFORD   Accompanied by self     Via the Health Maintenance questionnaire, the patient has reported the following services have been completed -Eye Exam: opthemologist 2023-10-15, this information has been sent to the abstraction team.  History of Present Illness       Diabetes:   He presents for follow up of diabetes.    He is not checking blood glucose.        He is concerned about other.   He is having numbness in feet, blurry vision and weight gain.  The patient has had a diabetic eye exam in the last 12  months. Eye exam performed on 2023. Location of last eye exam [place].        He eats 2-3 servings of fruits and vegetables daily.He consumes 0 sweetened beverage(s) daily.He exercises with enough effort to increase his heart rate 20 to 29 minutes per day.  He exercises with enough effort to increase his heart rate 7 days per week. He is missing 1 dose(s) of medications per week.     79-year-old gentleman comes in for follow-up of hypertension, hypercholesterolemia diabetes and other health problems listed in the past medical history.  Overall he is doing well.  He says he has not been good with his diet.  He has gained weight.  No chest pain or shortness of breath.  Otherwise he feels good.    Review of Systems  Constitutional, HEENT, cardiovascular, pulmonary, GI, , musculoskeletal, neuro, skin, endocrine and psych systems are negative, except as otherwise noted.      Objective    /77 (BP Location: Right arm, Patient Position: Sitting, Cuff Size: Adult Regular)   Pulse 69   Temp 98.2  F (36.8  C) (Oral)   Resp 16   Ht 1.829 m (6')   Wt 99.4 kg (219 lb 3.2 oz)   SpO2 97%   BMI 29.73 kg/m    Body mass index is 29.73 kg/m .  Physical Exam   GENERAL: alert and no distress  EYES: Eyes grossly normal to inspection, PERRL and conjunctivae and sclerae normal  HENT: ear canals and TM's normal, nose and mouth without ulcers or lesions  NECK: no adenopathy, no asymmetry, masses, or scars  RESP: lungs clear to auscultation - no rales, rhonchi or wheezes  CV: regular rate and rhythm, normal S1 S2, no S3 or S4, grade 2 systolic murmur left sternal border, click or rub, no peripheral edema  ABDOMEN: soft, nontender, no hepatosplenomegaly, no masses and bowel sounds normal  MS: no gross musculoskeletal defects noted, no edema  SKIN: no suspicious lesions or rashes  NEURO: Normal strength and tone, mentation intact and speech normal    Lab on 01/28/2025   Component Date Value Ref Range Status    Estimated Average  Glucose 01/28/2025 137 (H)  <117 mg/dL Final    Hemoglobin A1C 01/28/2025 6.4 (H)  0.0 - 5.6 % Final    Normal <5.7%   Prediabetes 5.7-6.4%    Diabetes 6.5% or higher     Note: Adopted from ADA consensus guidelines.    Sodium 01/28/2025 139  135 - 145 mmol/L Final    Potassium 01/28/2025 4.4  3.4 - 5.3 mmol/L Final    Chloride 01/28/2025 104  98 - 107 mmol/L Final    Carbon Dioxide (CO2) 01/28/2025 27  22 - 29 mmol/L Final    Anion Gap 01/28/2025 8  7 - 15 mmol/L Final    Urea Nitrogen 01/28/2025 21.3  8.0 - 23.0 mg/dL Final    Creatinine 01/28/2025 0.78  0.67 - 1.17 mg/dL Final    GFR Estimate 01/28/2025 >90  >60 mL/min/1.73m2 Final    eGFR calculated using 2021 CKD-EPI equation.    Calcium 01/28/2025 9.9  8.8 - 10.4 mg/dL Final    Glucose 01/28/2025 146 (H)  70 - 99 mg/dL Final    Patient Fasting > 8hrs? 01/28/2025 Yes   Final    Cholesterol 01/28/2025 131  <200 mg/dL Final    Triglycerides 01/28/2025 79  <150 mg/dL Final    Direct Measure HDL 01/28/2025 40  >=40 mg/dL Final    LDL Cholesterol Calculated 01/28/2025 75  <100 mg/dL Final    Non HDL Cholesterol 01/28/2025 91  <130 mg/dL Final    Patient Fasting > 8hrs? 01/28/2025 Yes   Final           Signed Electronically by: Rakesh Santoro MD

## 2025-05-11 ENCOUNTER — HEALTH MAINTENANCE LETTER (OUTPATIENT)
Age: 80
End: 2025-05-11

## 2025-07-01 ENCOUNTER — OFFICE VISIT (OUTPATIENT)
Dept: FAMILY MEDICINE | Facility: CLINIC | Age: 80
End: 2025-07-01
Payer: COMMERCIAL

## 2025-07-01 VITALS
RESPIRATION RATE: 18 BRPM | WEIGHT: 224 LBS | DIASTOLIC BLOOD PRESSURE: 76 MMHG | BODY MASS INDEX: 29.69 KG/M2 | OXYGEN SATURATION: 97 % | HEART RATE: 66 BPM | TEMPERATURE: 98 F | SYSTOLIC BLOOD PRESSURE: 127 MMHG | HEIGHT: 73 IN

## 2025-07-01 DIAGNOSIS — E11.9 TYPE 2 DIABETES MELLITUS WITHOUT COMPLICATION, WITHOUT LONG-TERM CURRENT USE OF INSULIN (H): ICD-10-CM

## 2025-07-01 DIAGNOSIS — Z01.818 PRE-OP EXAM: Primary | ICD-10-CM

## 2025-07-01 DIAGNOSIS — H25.9 AGE-RELATED CATARACT OF BOTH EYES, UNSPECIFIED AGE-RELATED CATARACT TYPE: ICD-10-CM

## 2025-07-01 DIAGNOSIS — Z86.79 S/P ABLATION OF ATRIAL FLUTTER: ICD-10-CM

## 2025-07-01 DIAGNOSIS — I10 ESSENTIAL HYPERTENSION: ICD-10-CM

## 2025-07-01 DIAGNOSIS — Z98.890 S/P ABLATION OF ATRIAL FLUTTER: ICD-10-CM

## 2025-07-01 DIAGNOSIS — L98.9 SKIN LESION OF RIGHT ARM: ICD-10-CM

## 2025-07-01 DIAGNOSIS — E78.00 HYPERCHOLESTEREMIA: ICD-10-CM

## 2025-07-01 DIAGNOSIS — I35.0 MILD AORTIC VALVE STENOSIS: ICD-10-CM

## 2025-07-01 PROBLEM — I48.92 ATRIAL FLUTTER (H): Chronic | Status: RESOLVED | Noted: 2023-10-19 | Resolved: 2025-07-01

## 2025-07-01 PROCEDURE — 3074F SYST BP LT 130 MM HG: CPT | Performed by: INTERNAL MEDICINE

## 2025-07-01 PROCEDURE — 99214 OFFICE O/P EST MOD 30 MIN: CPT | Performed by: INTERNAL MEDICINE

## 2025-07-01 PROCEDURE — G2211 COMPLEX E/M VISIT ADD ON: HCPCS | Performed by: INTERNAL MEDICINE

## 2025-07-01 PROCEDURE — 3078F DIAST BP <80 MM HG: CPT | Performed by: INTERNAL MEDICINE

## 2025-07-01 NOTE — PROGRESS NOTES
Preoperative Evaluation  [address] [address]  Phone: [phone]  Primary Provider: Rakesh Santoro MD  Pre-op Performing Provider: Rakesh Santoro MD  Jul 1, 2025 7/1/2025   Surgical Information   What procedure is being done? Cataract surgery bilateral   Facility or Hospital where procedure/surgery will be performed: [facility]   Who is doing the procedure / surgery? dr Shruthi Walsh   Date of surgery / procedure: [id number]   Time of surgery / procedure: 8am   Where do you plan to recover after surgery? at home with family     Fax number for surgical facility: [phone]    Assessment & Plan     1.  Preop physical exam completed.  Patient is medically optimized to undergo planned surgical procedure.  2.  Cataract bilateral.  Patient to undergo cataract surgery July 9 and July 23.  3.  Type 2 diabetes mellitus controlled with metformin.  Last hemoglobin A1c was 6.4.  4.  Essential hypertension well-controlled with losartan 25 mg a day.  5.  Hypercholesterolemia well-controlled with atorvastatin 10 mg a day.  6.  Mild aortic valve stenosis.  Closely followed by echocardiogram.  7.  S/p ablation of atrial flutter in October 2023.  No evidence of recurrence.  8.  Skin lesion on the right upper arm.  Clinically looks benign.  Patient will return in future for punch biopsy.      The proposed surgical procedure is considered LOW risk.     Recommendation  Approval given to proceed with proposed procedure, without further diagnostic evaluation.      Subjective   Marcellus is a 80 year old, presenting for the following:  Pre-Op Exam          7/1/2025     4:07 PM   Additional Questions   Roomed by [facility]     Via the Health Maintenance questionnaire, the patient has reported the following services have been completed -Eye Exam: [facility] 2024-10-06, this information has been sent to the abstraction team.    HPI:   80-year-old gentleman with history  of hypertension, type 2 diabetes mellitus, hypercholesterolemia is to undergo bilateral cataract surgery.  He is clinically feeling fine.  No chest pain, shortness of breath dizziness or lightheadedness.  He takes his medication as prescribed.  He has noticed a raised skin lesion right upper arm that he wanted me to check and removed.            7/1/2025   Pre-Op Questionnaire   Have you ever had a heart attack or stroke? No   Have you ever had surgery on your heart or blood vessels, such as a stent placement, a coronary artery bypass, or surgery on an artery in your head, neck, heart, or legs? No   Do you have chest pain with activity? No   Do you have a history of heart failure? No   Do you currently have a cold, bronchitis or symptoms of other infection? No   Do you have a cough, shortness of breath, or wheezing? No   Do you or anyone in your family have previous history of blood clots? No   Do you or does anyone in your family have a serious bleeding problem such as prolonged bleeding following surgeries or cuts? No   Have you ever had problems with anemia or been told to take iron pills? No   Have you had any abnormal blood loss such as black, tarry or bloody stools? No   Have you ever had a blood transfusion? No   Are you willing to have a blood transfusion if it is medically needed before, during, or after your surgery? Yes   Have you or any of your relatives ever had problems with anesthesia? No   Do you have sleep apnea, excessive snoring or daytime drowsiness? No   Do you have any artifical heart valves or other implanted medical devices like a pacemaker, defibrillator, or continuous glucose monitor? No   Do you have artificial joints? No   Are you allergic to latex? No     Advance Care Planning          Patient Active Problem List    Diagnosis Date Noted    Mild aortic valve stenosis 07/31/2024     Priority: Medium    Essential hypertension 05/29/2024     Priority: Medium    S/P ablation of atrial flutter  04/24/2024     Priority: Medium     [facility]      Type 2 diabetes mellitus without complication, without long-term current use of insulin (H) 10/19/2023     Priority: Medium    Atrial flutter (H) 10/19/2023     Priority: Medium    RBBB 10/19/2023     Priority: Medium    Iron deficiency 10/15/2023     Priority: Medium    Lumbar facet arthropathy 04/10/2023     Priority: Medium    Overweight (BMI 25.0-29.9) 12/01/2020     Priority: Medium    BPH with obstruction/lower urinary tract symptoms 12/01/2020     Priority: Medium    ROBE (generalized anxiety disorder)      Priority: Medium    Hypercholesteremia      Priority: Medium      Past Medical History:   Diagnosis Date    Atrial flutter (H) 10/19/2023    BPH with obstruction/lower urinary tract symptoms 12/01/2020    Essential hypertension 05/29/2024    ROBE (generalized anxiety disorder)     Hypercholesteremia     IFG (impaired fasting glucose) 12/01/2020    Iron deficiency 10/15/2023    Lumbar facet arthropathy 04/10/2023    Mild cognitive impairment 05/01/2012    Moderate aortic valve stenosis 07/31/2024    Overweight (BMI 25.0-29.9) 12/01/2020    RBBB 10/19/2023    Type 2 diabetes mellitus without complication, without long-term current use of insulin (H) 10/19/2023    Type 2 diabetes mellitus without complication, without long-term current use of insulin (H) 10/19/2023     Past Surgical History:   Procedure Laterality Date    APPENDECTOMY  1960    COLONOSCOPY WITH CO2 INSUFFLATION N/A 1/4/2021    Procedure: COLONOSCOPY, WITH CO2 INSUFFLATION;  Surgeon: Livier Nguyen DO;  Location:  OR    OPEN REDUCTION INTERNAL FIXATION FOREARM 2017     Current Outpatient Medications   Medication Sig Dispense Refill    atorvastatin (LIPITOR) 10 MG tablet Take 1 tablet (10 mg) by mouth daily 90 tablet 3    losartan (COZAAR) 25 MG tablet Take 1 tablet (25 mg) by mouth daily 90 tablet 3    metFORMIN (GLUCOPHAGE XR) 500 MG 24 hr tablet Take 1 tablet (500 mg) by mouth  "daily (with dinner) 90 tablet 3    sertraline (ZOLOFT) 50 MG tablet Take 1 tablet (50 mg) by mouth daily 90 tablet 3    tamsulosin (FLOMAX) 0.4 MG capsule Take 1 capsule (0.4 mg) by mouth daily 90 capsule 3       Allergies   Allergen Reactions    Etodolac Rash        Social History     Tobacco Use    Smoking status: Never     Passive exposure: Never    Smokeless tobacco: Never   Substance Use Topics    Alcohol use: Yes     Comment: occas     Family History   Problem Relation Age of Onset    Alzheimer Disease Mother 86    Leukemia Father 86    Lymphoma Sister 51     History   Drug Use Unknown             Review of Systems  Constitutional, HEENT, cardiovascular, pulmonary, GI, , musculoskeletal, neuro, skin, endocrine and psych systems are negative, except as otherwise noted.    Objective    /76 (BP Location: Right arm, Patient Position: Sitting, Cuff Size: Adult Large)   Pulse 66   Resp 18   Ht 1.854 m (6' 1\")   Wt 101.6 kg (224 lb)   SpO2 97%   BMI 29.55 kg/m     Estimated body mass index is 29.55 kg/m  as calculated from the following:    Height as of this encounter: 1.854 m (6' 1\").    Weight as of this encounter: 101.6 kg (224 lb).  Physical Exam  GENERAL: alert and no distress  EYES: Eyes grossly normal to inspection, PERRL and conjunctivae and sclerae normal  HENT: ear canals and TM's normal, nose and mouth without ulcers or lesions  NECK: no adenopathy, no asymmetry, masses, or scars  RESP: lungs clear to auscultation - no rales, rhonchi or wheezes  CV: regular rates and rhythm, normal S1 S2, no S3 or S4, grade 2/6 systolic murmur heard best over the left lateral border, peripheral pulses strong, and no peripheral edema  ABDOMEN: soft, nontender, no hepatosplenomegaly, no masses and bowel sounds normal  MS: no gross musculoskeletal defects noted, no edema  SKIN: no suspicious lesions or rashes.  There is a 3 to 4 mm slightly raised skin lesion right mid upper arm.  Clinically lesion looks " benign.  NEURO: Normal strength and tone, mentation intact and speech normal  PSYCH: mentation appears normal, affect normal/bright  Diabetic foot exam: normal DP and PT pulses, no trophic changes or ulcerative lesions, and normal sensory exam    Recent Labs   Lab Test 01/28/25  0821 07/22/24  0804    139   POTASSIUM 4.4 4.1   CR 0.78 0.72   A1C 6.4* 6.1*        Diagnostics     No EKG required for low risk surgery (cataract, skin procedure, breast biopsy, etc).    Revised Cardiac Risk Index (RCRI)  The patient has the following serious cardiovascular risks for perioperative complications:   - No serious cardiac risks = 0 points     RCRI Interpretation: 0 points: Class I (very low risk - 0.4% complication rate)         Signed Electronically by: Rakesh Santoro MD  A copy of this evaluation report is provided to the requesting physician.

## 2025-08-05 ENCOUNTER — OFFICE VISIT (OUTPATIENT)
Dept: FAMILY MEDICINE | Facility: CLINIC | Age: 80
End: 2025-08-05
Payer: COMMERCIAL

## 2025-08-05 VITALS
DIASTOLIC BLOOD PRESSURE: 76 MMHG | BODY MASS INDEX: 29.93 KG/M2 | OXYGEN SATURATION: 96 % | WEIGHT: 221 LBS | TEMPERATURE: 98.1 F | RESPIRATION RATE: 16 BRPM | SYSTOLIC BLOOD PRESSURE: 130 MMHG | HEIGHT: 72 IN | HEART RATE: 63 BPM

## 2025-08-05 DIAGNOSIS — E11.9 TYPE 2 DIABETES MELLITUS WITHOUT COMPLICATION, WITHOUT LONG-TERM CURRENT USE OF INSULIN (H): ICD-10-CM

## 2025-08-05 DIAGNOSIS — F41.1 GAD (GENERALIZED ANXIETY DISORDER): ICD-10-CM

## 2025-08-05 DIAGNOSIS — N13.8 BPH WITH OBSTRUCTION/LOWER URINARY TRACT SYMPTOMS: ICD-10-CM

## 2025-08-05 DIAGNOSIS — Z86.79 S/P ABLATION OF ATRIAL FLUTTER: ICD-10-CM

## 2025-08-05 DIAGNOSIS — I35.0 MILD AORTIC VALVE STENOSIS: ICD-10-CM

## 2025-08-05 DIAGNOSIS — Z98.890 S/P ABLATION OF ATRIAL FLUTTER: ICD-10-CM

## 2025-08-05 DIAGNOSIS — I10 ESSENTIAL HYPERTENSION: ICD-10-CM

## 2025-08-05 DIAGNOSIS — E78.00 HYPERCHOLESTEREMIA: ICD-10-CM

## 2025-08-05 DIAGNOSIS — N40.1 BPH WITH OBSTRUCTION/LOWER URINARY TRACT SYMPTOMS: ICD-10-CM

## 2025-08-05 DIAGNOSIS — Z00.00 MEDICARE ANNUAL WELLNESS VISIT, SUBSEQUENT: Primary | ICD-10-CM

## 2025-08-05 PROBLEM — E66.3 OVERWEIGHT (BMI 25.0-29.9): Status: ACTIVE | Noted: 2020-12-01

## 2025-08-05 PROBLEM — E66.3 OVERWEIGHT (BMI 25.0-29.9): Status: RESOLVED | Noted: 2020-12-01 | Resolved: 2025-08-05

## 2025-08-05 PROCEDURE — G2211 COMPLEX E/M VISIT ADD ON: HCPCS | Performed by: INTERNAL MEDICINE

## 2025-08-05 PROCEDURE — 3075F SYST BP GE 130 - 139MM HG: CPT | Performed by: INTERNAL MEDICINE

## 2025-08-05 PROCEDURE — 99214 OFFICE O/P EST MOD 30 MIN: CPT | Mod: 25 | Performed by: INTERNAL MEDICINE

## 2025-08-05 PROCEDURE — 3078F DIAST BP <80 MM HG: CPT | Performed by: INTERNAL MEDICINE

## 2025-08-05 PROCEDURE — 3044F HG A1C LEVEL LT 7.0%: CPT | Performed by: INTERNAL MEDICINE

## 2025-08-05 PROCEDURE — G0439 PPPS, SUBSEQ VISIT: HCPCS | Performed by: INTERNAL MEDICINE

## 2025-08-05 RX ORDER — ATORVASTATIN CALCIUM 10 MG/1
10 TABLET, FILM COATED ORAL DAILY
Qty: 90 TABLET | Refills: 3 | Status: SHIPPED | OUTPATIENT
Start: 2025-08-05

## 2025-08-05 RX ORDER — TAMSULOSIN HYDROCHLORIDE 0.4 MG/1
0.4 CAPSULE ORAL DAILY
Qty: 90 CAPSULE | Refills: 3 | Status: SHIPPED | OUTPATIENT
Start: 2025-08-05

## 2025-08-05 RX ORDER — LOSARTAN POTASSIUM 25 MG/1
25 TABLET ORAL DAILY
Qty: 90 TABLET | Refills: 3 | Status: SHIPPED | OUTPATIENT
Start: 2025-08-05

## 2025-08-05 RX ORDER — METFORMIN HYDROCHLORIDE 500 MG/1
500 TABLET, EXTENDED RELEASE ORAL
Qty: 90 TABLET | Refills: 3 | Status: SHIPPED | OUTPATIENT
Start: 2025-08-05

## 2025-08-05 SDOH — HEALTH STABILITY: PHYSICAL HEALTH: ON AVERAGE, HOW MANY DAYS PER WEEK DO YOU ENGAGE IN MODERATE TO STRENUOUS EXERCISE (LIKE A BRISK WALK)?: 5 DAYS

## 2025-08-05 ASSESSMENT — SOCIAL DETERMINANTS OF HEALTH (SDOH): HOW OFTEN DO YOU GET TOGETHER WITH FRIENDS OR RELATIVES?: ONCE A WEEK

## 2025-08-20 ENCOUNTER — OFFICE VISIT (OUTPATIENT)
Dept: FAMILY MEDICINE | Facility: CLINIC | Age: 80
End: 2025-08-20
Payer: COMMERCIAL

## 2025-08-20 VITALS
BODY MASS INDEX: 28.72 KG/M2 | HEART RATE: 64 BPM | TEMPERATURE: 98.2 F | HEIGHT: 74 IN | WEIGHT: 223.8 LBS | DIASTOLIC BLOOD PRESSURE: 69 MMHG | SYSTOLIC BLOOD PRESSURE: 120 MMHG | RESPIRATION RATE: 16 BRPM | OXYGEN SATURATION: 95 %

## 2025-08-20 DIAGNOSIS — B07.9 VIRAL WARTS, UNSPECIFIED TYPE: Primary | ICD-10-CM

## 2025-08-20 PROCEDURE — 3078F DIAST BP <80 MM HG: CPT | Performed by: INTERNAL MEDICINE

## 2025-08-20 PROCEDURE — 1126F AMNT PAIN NOTED NONE PRSNT: CPT | Performed by: INTERNAL MEDICINE

## 2025-08-20 PROCEDURE — 17110 DESTRUCTION B9 LES UP TO 14: CPT | Performed by: INTERNAL MEDICINE

## 2025-08-20 PROCEDURE — 3074F SYST BP LT 130 MM HG: CPT | Performed by: INTERNAL MEDICINE

## 2025-08-20 ASSESSMENT — PAIN SCALES - GENERAL: PAINLEVEL_OUTOF10: NO PAIN (0)

## (undated) DEVICE — KIT ENDO FIRST STEP DISINFECTANT 200ML W/POUCH EP-4

## (undated) DEVICE — PAD CHUX UNDERPAD 23X24" 7136

## (undated) DEVICE — PREP CHLORAPREP 26ML TINTED ORANGE  260815

## (undated) RX ORDER — FENTANYL CITRATE 50 UG/ML
INJECTION, SOLUTION INTRAMUSCULAR; INTRAVENOUS
Status: DISPENSED
Start: 2021-01-04

## (undated) RX ORDER — SIMETHICONE 40MG/0.6ML
SUSPENSION, DROPS(FINAL DOSAGE FORM)(ML) ORAL
Status: DISPENSED
Start: 2021-01-04